# Patient Record
Sex: MALE | Race: WHITE | NOT HISPANIC OR LATINO
[De-identification: names, ages, dates, MRNs, and addresses within clinical notes are randomized per-mention and may not be internally consistent; named-entity substitution may affect disease eponyms.]

---

## 2018-06-06 ENCOUNTER — APPOINTMENT (OUTPATIENT)
Dept: ORTHOPEDIC SURGERY | Facility: CLINIC | Age: 73
End: 2018-06-06
Payer: MEDICARE

## 2018-06-06 VITALS — RESPIRATION RATE: 16 BRPM | HEIGHT: 67 IN | WEIGHT: 145 LBS | BODY MASS INDEX: 22.76 KG/M2

## 2018-06-06 DIAGNOSIS — Z87.438 PERSONAL HISTORY OF OTHER DISEASES OF MALE GENITAL ORGANS: ICD-10-CM

## 2018-06-06 PROCEDURE — 99203 OFFICE O/P NEW LOW 30 MIN: CPT | Mod: 25

## 2018-12-19 ENCOUNTER — APPOINTMENT (OUTPATIENT)
Dept: ORTHOPEDIC SURGERY | Facility: CLINIC | Age: 73
End: 2018-12-19
Payer: MEDICARE

## 2018-12-19 VITALS — WEIGHT: 145 LBS | RESPIRATION RATE: 16 BRPM | HEIGHT: 67 IN | BODY MASS INDEX: 22.76 KG/M2

## 2018-12-19 DIAGNOSIS — M65.331 TRIGGER FINGER, RIGHT MIDDLE FINGER: ICD-10-CM

## 2018-12-19 DIAGNOSIS — M18.0 BILATERAL PRIMARY OSTEOARTHRITIS OF FIRST CARPOMETACARPAL JOINTS: ICD-10-CM

## 2018-12-19 PROCEDURE — 20550 NJX 1 TENDON SHEATH/LIGAMENT: CPT | Mod: F6

## 2018-12-19 PROCEDURE — 99214 OFFICE O/P EST MOD 30 MIN: CPT | Mod: 25

## 2019-10-21 ENCOUNTER — APPOINTMENT (OUTPATIENT)
Dept: GASTROENTEROLOGY | Facility: CLINIC | Age: 74
End: 2019-10-21

## 2020-11-20 ENCOUNTER — APPOINTMENT (OUTPATIENT)
Dept: ORTHOPEDIC SURGERY | Facility: CLINIC | Age: 75
End: 2020-11-20
Payer: MEDICARE

## 2020-11-20 VITALS — BODY MASS INDEX: 22.76 KG/M2 | RESPIRATION RATE: 16 BRPM | HEIGHT: 67 IN | WEIGHT: 145 LBS

## 2020-11-20 DIAGNOSIS — M65.321 TRIGGER FINGER, RIGHT INDEX FINGER: ICD-10-CM

## 2020-11-20 DIAGNOSIS — M65.351 TRIGGER FINGER, RIGHT LITTLE FINGER: ICD-10-CM

## 2020-11-20 PROCEDURE — 20550 NJX 1 TENDON SHEATH/LIGAMENT: CPT | Mod: F9

## 2020-11-20 PROCEDURE — 99214 OFFICE O/P EST MOD 30 MIN: CPT | Mod: 25

## 2022-04-11 ENCOUNTER — APPOINTMENT (OUTPATIENT)
Dept: OTOLARYNGOLOGY | Facility: CLINIC | Age: 77
End: 2022-04-11

## 2023-07-22 ENCOUNTER — NON-APPOINTMENT (OUTPATIENT)
Age: 78
End: 2023-07-22

## 2023-07-26 ENCOUNTER — NON-APPOINTMENT (OUTPATIENT)
Age: 78
End: 2023-07-26

## 2023-07-27 ENCOUNTER — APPOINTMENT (OUTPATIENT)
Dept: ORTHOPEDIC SURGERY | Facility: CLINIC | Age: 78
End: 2023-07-27
Payer: MEDICARE

## 2023-07-27 ENCOUNTER — TRANSCRIPTION ENCOUNTER (OUTPATIENT)
Age: 78
End: 2023-07-27

## 2023-07-27 VITALS
HEART RATE: 72 BPM | BODY MASS INDEX: 22.76 KG/M2 | DIASTOLIC BLOOD PRESSURE: 84 MMHG | SYSTOLIC BLOOD PRESSURE: 159 MMHG | OXYGEN SATURATION: 96 % | HEIGHT: 67 IN | WEIGHT: 145 LBS

## 2023-07-27 DIAGNOSIS — M43.16 SPONDYLOLISTHESIS, LUMBAR REGION: ICD-10-CM

## 2023-07-27 PROCEDURE — 99204 OFFICE O/P NEW MOD 45 MIN: CPT

## 2023-07-27 RX ORDER — OXYCODONE 10 MG/1
10 TABLET ORAL
Refills: 0 | Status: ACTIVE | COMMUNITY

## 2023-07-27 RX ORDER — OLMESARTAN MEDOXOMIL 20 MG/1
20 TABLET, FILM COATED ORAL
Refills: 0 | Status: ACTIVE | COMMUNITY

## 2023-07-27 RX ORDER — BUPRENORPHINE HYDROCHLORIDE 75 UG/1
75 FILM, SOLUBLE BUCCAL
Refills: 0 | Status: ACTIVE | COMMUNITY

## 2023-07-27 RX ORDER — VENLAFAXINE HYDROCHLORIDE 37.5 MG/1
37.5 CAPSULE, EXTENDED RELEASE ORAL
Refills: 0 | Status: ACTIVE | COMMUNITY

## 2023-07-27 RX ORDER — SILODOSIN 8 MG/1
8 CAPSULE ORAL
Refills: 0 | Status: ACTIVE | COMMUNITY

## 2023-07-27 RX ORDER — SELEGILINE HYDROCHLORIDE 5 MG/1
5 CAPSULE ORAL
Refills: 0 | Status: ACTIVE | COMMUNITY

## 2023-08-02 ENCOUNTER — APPOINTMENT (OUTPATIENT)
Dept: ORTHOPEDIC SURGERY | Facility: CLINIC | Age: 78
End: 2023-08-02
Payer: MEDICARE

## 2023-08-02 VITALS
DIASTOLIC BLOOD PRESSURE: 82 MMHG | HEART RATE: 73 BPM | BODY MASS INDEX: 21.69 KG/M2 | HEIGHT: 66 IN | OXYGEN SATURATION: 96 % | WEIGHT: 135 LBS | SYSTOLIC BLOOD PRESSURE: 138 MMHG | TEMPERATURE: 98 F

## 2023-08-02 VITALS — HEIGHT: 66 IN | WEIGHT: 135 LBS | BODY MASS INDEX: 21.69 KG/M2

## 2023-08-02 PROCEDURE — 99214 OFFICE O/P EST MOD 30 MIN: CPT

## 2023-08-02 RX ORDER — VENLAFAXINE HYDROCHLORIDE 150 MG/1
150 CAPSULE, EXTENDED RELEASE ORAL
Refills: 0 | Status: COMPLETED | COMMUNITY
End: 2023-08-02

## 2023-08-09 NOTE — PHYSICAL EXAM
[de-identified] : NVI, except bilateral EHL/tib ant 4/5.   Lumbar discomfrt with extension.  + bilateral SLR.   [de-identified] : MRI L spine 5/16/23:  L4-5 spondylolisthesis, severe central spinal stenosis\par \par No xray

## 2023-08-09 NOTE — DISCUSSION/SUMMARY
[Surgical risks reviewed] : Surgical risks reviewed [de-identified] : A lengthy discussion was held with the patient regarding his condition.  We discussed nonoperative treatment strategies including physical therapy, pharmacologic management and steroid injections.  We discussed surgical options (PSF L3-5) and the attendant benefits, alternatives, and risks, including but not limited to infection, bleeding, persistent pain, persistent neurologic deficit, neurologic injury, adjacent segment degeneration, and the need for future surgery.  The patient's questions were sought and answered satisfactorily.. He will proceed with medical clearance.   I, Letitia Weems NP am acting as a scribe for Dr. Frank Schwab.

## 2023-08-09 NOTE — HISTORY OF PRESENT ILLNESS
[de-identified] : Pt presents with h/o low back pain.  He was dx'd with spondy 35 years ago.  In February 2023, developed pain in his buttocks, posterior thighs, to the knees, not below the knees.  He currently has no lbp, only radicular symptoms.   He has felt better in the past few weeks, can walk about 20 blocks.  Has been in PT.  Had 3 ESIs- first 2 lasted about a month, 3rd still keeps him "mobile".

## 2023-08-09 NOTE — DISCUSSION/SUMMARY
[de-identified] : A lengthy discussion was had with the patient regarding his condition.  Rx CT lumbar spine - r/o L4 lysis?.   Discussed possible L4-5 tlif pending imaging.  Can review via telehealth or via phone. \par The patient's questions were sought and answered satisfactorily.\par \par \par Letitia VILA, NP am acting as a scribe for Dr. Frank Schwab.\par \par

## 2023-08-09 NOTE — PHYSICAL EXAM
[de-identified] : NVI, except bilateral EHL/tib ant 4/5. Lumbar discomfrt with extension. + bilateral SLR. [de-identified] :  MRI L spine 5/16/23: L4-5 spondylolisthesis, severe central spinal stenosis  No xray.  CT L spine 7/28/2023:  L4-5 spondylolisthesis L3-4 advanced degeneration with cystic changes.

## 2023-08-09 NOTE — HISTORY OF PRESENT ILLNESS
[de-identified] : Pt presents in f/u to review CT L spine ([facility]).  No significant changes in symptoms.  Has lower back and posterior thigh pain with walking.  He needs to hold onto kitchen counter when preparing dinner due to low back pain.   7/27/2023: Pt presents with h/o low back pain. He was dx'd with spondy 35 years ago. In February 2023, developed pain in his buttocks, posterior thighs, to the knees, not below the knees. He currently has no lbp, only radicular symptoms. He has felt better in the past few weeks, can walk about 20 blocks. Has been in PT. Had 3 ESIs- first 2 lasted about a month, 3rd still keeps him "mobile".

## 2023-12-01 ENCOUNTER — INPATIENT (INPATIENT)
Facility: HOSPITAL | Age: 78
LOS: 4 days | Discharge: NURSING, REHAB OR LONG-TERM CARE | DRG: 522 | End: 2023-12-06
Attending: ORTHOPAEDIC SURGERY | Admitting: ORTHOPAEDIC SURGERY
Payer: MEDICARE

## 2023-12-01 ENCOUNTER — TRANSCRIPTION ENCOUNTER (OUTPATIENT)
Age: 78
End: 2023-12-01

## 2023-12-01 VITALS
OXYGEN SATURATION: 92 % | SYSTOLIC BLOOD PRESSURE: 171 MMHG | TEMPERATURE: 99 F | RESPIRATION RATE: 16 BRPM | HEART RATE: 80 BPM | DIASTOLIC BLOOD PRESSURE: 82 MMHG

## 2023-12-01 DIAGNOSIS — F13.20 SEDATIVE, HYPNOTIC OR ANXIOLYTIC DEPENDENCE, UNCOMPLICATED: ICD-10-CM

## 2023-12-01 DIAGNOSIS — Z01.818 ENCOUNTER FOR OTHER PREPROCEDURAL EXAMINATION: ICD-10-CM

## 2023-12-01 DIAGNOSIS — Z98.890 OTHER SPECIFIED POSTPROCEDURAL STATES: Chronic | ICD-10-CM

## 2023-12-01 LAB
ALBUMIN SERPL ELPH-MCNC: 3.9 G/DL — SIGNIFICANT CHANGE UP (ref 3.3–5)
ALBUMIN SERPL ELPH-MCNC: 3.9 G/DL — SIGNIFICANT CHANGE UP (ref 3.3–5)
ALP SERPL-CCNC: 105 U/L — SIGNIFICANT CHANGE UP (ref 40–120)
ALP SERPL-CCNC: 105 U/L — SIGNIFICANT CHANGE UP (ref 40–120)
ALT FLD-CCNC: 31 U/L — SIGNIFICANT CHANGE UP (ref 10–45)
ALT FLD-CCNC: 31 U/L — SIGNIFICANT CHANGE UP (ref 10–45)
AMPHET UR-MCNC: NEGATIVE — SIGNIFICANT CHANGE UP
AMPHET UR-MCNC: NEGATIVE — SIGNIFICANT CHANGE UP
ANION GAP SERPL CALC-SCNC: 12 MMOL/L — SIGNIFICANT CHANGE UP (ref 5–17)
ANION GAP SERPL CALC-SCNC: 12 MMOL/L — SIGNIFICANT CHANGE UP (ref 5–17)
ANISOCYTOSIS BLD QL: SLIGHT — SIGNIFICANT CHANGE UP
ANISOCYTOSIS BLD QL: SLIGHT — SIGNIFICANT CHANGE UP
APAP SERPL-MCNC: <5 UG/ML — LOW (ref 10–30)
APAP SERPL-MCNC: <5 UG/ML — LOW (ref 10–30)
APPEARANCE UR: CLEAR — SIGNIFICANT CHANGE UP
APPEARANCE UR: CLEAR — SIGNIFICANT CHANGE UP
APTT BLD: 27.9 SEC — SIGNIFICANT CHANGE UP (ref 24.5–35.6)
APTT BLD: 27.9 SEC — SIGNIFICANT CHANGE UP (ref 24.5–35.6)
AST SERPL-CCNC: 28 U/L — SIGNIFICANT CHANGE UP (ref 10–40)
AST SERPL-CCNC: 28 U/L — SIGNIFICANT CHANGE UP (ref 10–40)
BACTERIA # UR AUTO: NEGATIVE /HPF — SIGNIFICANT CHANGE UP
BACTERIA # UR AUTO: NEGATIVE /HPF — SIGNIFICANT CHANGE UP
BARBITURATES UR SCN-MCNC: NEGATIVE — SIGNIFICANT CHANGE UP
BARBITURATES UR SCN-MCNC: NEGATIVE — SIGNIFICANT CHANGE UP
BASOPHILS # BLD AUTO: 0.09 K/UL — SIGNIFICANT CHANGE UP (ref 0–0.2)
BASOPHILS # BLD AUTO: 0.09 K/UL — SIGNIFICANT CHANGE UP (ref 0–0.2)
BASOPHILS NFR BLD AUTO: 0.9 % — SIGNIFICANT CHANGE UP (ref 0–2)
BASOPHILS NFR BLD AUTO: 0.9 % — SIGNIFICANT CHANGE UP (ref 0–2)
BENZODIAZ UR-MCNC: POSITIVE
BENZODIAZ UR-MCNC: POSITIVE
BILIRUB SERPL-MCNC: 0.4 MG/DL — SIGNIFICANT CHANGE UP (ref 0.2–1.2)
BILIRUB SERPL-MCNC: 0.4 MG/DL — SIGNIFICANT CHANGE UP (ref 0.2–1.2)
BILIRUB UR-MCNC: NEGATIVE — SIGNIFICANT CHANGE UP
BILIRUB UR-MCNC: NEGATIVE — SIGNIFICANT CHANGE UP
BLD GP AB SCN SERPL QL: NEGATIVE — SIGNIFICANT CHANGE UP
BLD GP AB SCN SERPL QL: NEGATIVE — SIGNIFICANT CHANGE UP
BUN SERPL-MCNC: 32 MG/DL — HIGH (ref 7–23)
BUN SERPL-MCNC: 32 MG/DL — HIGH (ref 7–23)
CALCIUM SERPL-MCNC: 9.2 MG/DL — SIGNIFICANT CHANGE UP (ref 8.4–10.5)
CALCIUM SERPL-MCNC: 9.2 MG/DL — SIGNIFICANT CHANGE UP (ref 8.4–10.5)
CHLORIDE SERPL-SCNC: 101 MMOL/L — SIGNIFICANT CHANGE UP (ref 96–108)
CHLORIDE SERPL-SCNC: 101 MMOL/L — SIGNIFICANT CHANGE UP (ref 96–108)
CK MB CFR SERPL CALC: 6.7 NG/ML — SIGNIFICANT CHANGE UP (ref 0–6.7)
CK MB CFR SERPL CALC: 6.7 NG/ML — SIGNIFICANT CHANGE UP (ref 0–6.7)
CK SERPL-CCNC: 163 U/L — SIGNIFICANT CHANGE UP (ref 30–200)
CK SERPL-CCNC: 163 U/L — SIGNIFICANT CHANGE UP (ref 30–200)
CO2 SERPL-SCNC: 25 MMOL/L — SIGNIFICANT CHANGE UP (ref 22–31)
CO2 SERPL-SCNC: 25 MMOL/L — SIGNIFICANT CHANGE UP (ref 22–31)
COCAINE METAB.OTHER UR-MCNC: NEGATIVE — SIGNIFICANT CHANGE UP
COCAINE METAB.OTHER UR-MCNC: NEGATIVE — SIGNIFICANT CHANGE UP
COLOR SPEC: SIGNIFICANT CHANGE UP
COLOR SPEC: SIGNIFICANT CHANGE UP
CREAT SERPL-MCNC: 0.69 MG/DL — SIGNIFICANT CHANGE UP (ref 0.5–1.3)
CREAT SERPL-MCNC: 0.69 MG/DL — SIGNIFICANT CHANGE UP (ref 0.5–1.3)
DIFF PNL FLD: NEGATIVE — SIGNIFICANT CHANGE UP
DIFF PNL FLD: NEGATIVE — SIGNIFICANT CHANGE UP
EGFR: 95 ML/MIN/1.73M2 — SIGNIFICANT CHANGE UP
EGFR: 95 ML/MIN/1.73M2 — SIGNIFICANT CHANGE UP
EOSINOPHIL # BLD AUTO: 0 K/UL — SIGNIFICANT CHANGE UP (ref 0–0.5)
EOSINOPHIL # BLD AUTO: 0 K/UL — SIGNIFICANT CHANGE UP (ref 0–0.5)
EOSINOPHIL NFR BLD AUTO: 0 % — SIGNIFICANT CHANGE UP (ref 0–6)
EOSINOPHIL NFR BLD AUTO: 0 % — SIGNIFICANT CHANGE UP (ref 0–6)
ETHANOL SERPL-MCNC: <10 MG/DL — SIGNIFICANT CHANGE UP (ref 0–10)
ETHANOL SERPL-MCNC: <10 MG/DL — SIGNIFICANT CHANGE UP (ref 0–10)
GLUCOSE SERPL-MCNC: 134 MG/DL — HIGH (ref 70–99)
GLUCOSE SERPL-MCNC: 134 MG/DL — HIGH (ref 70–99)
GLUCOSE UR QL: NEGATIVE MG/DL — SIGNIFICANT CHANGE UP
GLUCOSE UR QL: NEGATIVE MG/DL — SIGNIFICANT CHANGE UP
HCT VFR BLD CALC: 34.7 % — LOW (ref 39–50)
HCT VFR BLD CALC: 34.7 % — LOW (ref 39–50)
HGB BLD-MCNC: 11.5 G/DL — LOW (ref 13–17)
HGB BLD-MCNC: 11.5 G/DL — LOW (ref 13–17)
HYPOCHROMIA BLD QL: SLIGHT — SIGNIFICANT CHANGE UP
HYPOCHROMIA BLD QL: SLIGHT — SIGNIFICANT CHANGE UP
INR BLD: 0.92 — SIGNIFICANT CHANGE UP (ref 0.85–1.18)
INR BLD: 0.92 — SIGNIFICANT CHANGE UP (ref 0.85–1.18)
KETONES UR-MCNC: 15 MG/DL
KETONES UR-MCNC: 15 MG/DL
LEUKOCYTE ESTERASE UR-ACNC: NEGATIVE — SIGNIFICANT CHANGE UP
LEUKOCYTE ESTERASE UR-ACNC: NEGATIVE — SIGNIFICANT CHANGE UP
LYMPHOCYTES # BLD AUTO: 0.16 K/UL — LOW (ref 1–3.3)
LYMPHOCYTES # BLD AUTO: 0.16 K/UL — LOW (ref 1–3.3)
LYMPHOCYTES # BLD AUTO: 1.7 % — LOW (ref 13–44)
LYMPHOCYTES # BLD AUTO: 1.7 % — LOW (ref 13–44)
MACROCYTES BLD QL: SLIGHT — SIGNIFICANT CHANGE UP
MACROCYTES BLD QL: SLIGHT — SIGNIFICANT CHANGE UP
MAGNESIUM SERPL-MCNC: 2 MG/DL — SIGNIFICANT CHANGE UP (ref 1.6–2.6)
MAGNESIUM SERPL-MCNC: 2 MG/DL — SIGNIFICANT CHANGE UP (ref 1.6–2.6)
MANUAL SMEAR VERIFICATION: SIGNIFICANT CHANGE UP
MANUAL SMEAR VERIFICATION: SIGNIFICANT CHANGE UP
MCHC RBC-ENTMCNC: 28.7 PG — SIGNIFICANT CHANGE UP (ref 27–34)
MCHC RBC-ENTMCNC: 28.7 PG — SIGNIFICANT CHANGE UP (ref 27–34)
MCHC RBC-ENTMCNC: 33.1 GM/DL — SIGNIFICANT CHANGE UP (ref 32–36)
MCHC RBC-ENTMCNC: 33.1 GM/DL — SIGNIFICANT CHANGE UP (ref 32–36)
MCV RBC AUTO: 86.5 FL — SIGNIFICANT CHANGE UP (ref 80–100)
MCV RBC AUTO: 86.5 FL — SIGNIFICANT CHANGE UP (ref 80–100)
METHADONE UR-MCNC: NEGATIVE — SIGNIFICANT CHANGE UP
METHADONE UR-MCNC: NEGATIVE — SIGNIFICANT CHANGE UP
MICROCYTES BLD QL: SLIGHT — SIGNIFICANT CHANGE UP
MICROCYTES BLD QL: SLIGHT — SIGNIFICANT CHANGE UP
MONOCYTES # BLD AUTO: 0.42 K/UL — SIGNIFICANT CHANGE UP (ref 0–0.9)
MONOCYTES # BLD AUTO: 0.42 K/UL — SIGNIFICANT CHANGE UP (ref 0–0.9)
MONOCYTES NFR BLD AUTO: 4.4 % — SIGNIFICANT CHANGE UP (ref 2–14)
MONOCYTES NFR BLD AUTO: 4.4 % — SIGNIFICANT CHANGE UP (ref 2–14)
NEUTROPHILS # BLD AUTO: 8.95 K/UL — HIGH (ref 1.8–7.4)
NEUTROPHILS # BLD AUTO: 8.95 K/UL — HIGH (ref 1.8–7.4)
NEUTROPHILS NFR BLD AUTO: 93 % — HIGH (ref 43–77)
NEUTROPHILS NFR BLD AUTO: 93 % — HIGH (ref 43–77)
NITRITE UR-MCNC: NEGATIVE — SIGNIFICANT CHANGE UP
NITRITE UR-MCNC: NEGATIVE — SIGNIFICANT CHANGE UP
OPIATES UR-MCNC: POSITIVE
OPIATES UR-MCNC: POSITIVE
OVALOCYTES BLD QL SMEAR: SLIGHT — SIGNIFICANT CHANGE UP
OVALOCYTES BLD QL SMEAR: SLIGHT — SIGNIFICANT CHANGE UP
PCP SPEC-MCNC: SIGNIFICANT CHANGE UP
PCP SPEC-MCNC: SIGNIFICANT CHANGE UP
PCP UR-MCNC: NEGATIVE — SIGNIFICANT CHANGE UP
PCP UR-MCNC: NEGATIVE — SIGNIFICANT CHANGE UP
PH UR: 5.5 — SIGNIFICANT CHANGE UP (ref 5–8)
PH UR: 5.5 — SIGNIFICANT CHANGE UP (ref 5–8)
PHOSPHATE SERPL-MCNC: 3.2 MG/DL — SIGNIFICANT CHANGE UP (ref 2.5–4.5)
PHOSPHATE SERPL-MCNC: 3.2 MG/DL — SIGNIFICANT CHANGE UP (ref 2.5–4.5)
PLAT MORPH BLD: ABNORMAL
PLAT MORPH BLD: ABNORMAL
PLATELET # BLD AUTO: 279 K/UL — SIGNIFICANT CHANGE UP (ref 150–400)
PLATELET # BLD AUTO: 279 K/UL — SIGNIFICANT CHANGE UP (ref 150–400)
POIKILOCYTOSIS BLD QL AUTO: SLIGHT — SIGNIFICANT CHANGE UP
POIKILOCYTOSIS BLD QL AUTO: SLIGHT — SIGNIFICANT CHANGE UP
POLYCHROMASIA BLD QL SMEAR: SLIGHT — SIGNIFICANT CHANGE UP
POLYCHROMASIA BLD QL SMEAR: SLIGHT — SIGNIFICANT CHANGE UP
POTASSIUM SERPL-MCNC: 4 MMOL/L — SIGNIFICANT CHANGE UP (ref 3.5–5.3)
POTASSIUM SERPL-MCNC: 4 MMOL/L — SIGNIFICANT CHANGE UP (ref 3.5–5.3)
POTASSIUM SERPL-SCNC: 4 MMOL/L — SIGNIFICANT CHANGE UP (ref 3.5–5.3)
POTASSIUM SERPL-SCNC: 4 MMOL/L — SIGNIFICANT CHANGE UP (ref 3.5–5.3)
PROT SERPL-MCNC: 6.3 G/DL — SIGNIFICANT CHANGE UP (ref 6–8.3)
PROT SERPL-MCNC: 6.3 G/DL — SIGNIFICANT CHANGE UP (ref 6–8.3)
PROT UR-MCNC: 30 MG/DL
PROT UR-MCNC: 30 MG/DL
PROTHROM AB SERPL-ACNC: 10.5 SEC — SIGNIFICANT CHANGE UP (ref 9.5–13)
PROTHROM AB SERPL-ACNC: 10.5 SEC — SIGNIFICANT CHANGE UP (ref 9.5–13)
RBC # BLD: 4.01 M/UL — LOW (ref 4.2–5.8)
RBC # BLD: 4.01 M/UL — LOW (ref 4.2–5.8)
RBC # FLD: 13 % — SIGNIFICANT CHANGE UP (ref 10.3–14.5)
RBC # FLD: 13 % — SIGNIFICANT CHANGE UP (ref 10.3–14.5)
RBC BLD AUTO: ABNORMAL
RBC BLD AUTO: ABNORMAL
RBC CASTS # UR COMP ASSIST: 3 /HPF — SIGNIFICANT CHANGE UP (ref 0–4)
RBC CASTS # UR COMP ASSIST: 3 /HPF — SIGNIFICANT CHANGE UP (ref 0–4)
RH IG SCN BLD-IMP: POSITIVE — SIGNIFICANT CHANGE UP
RH IG SCN BLD-IMP: POSITIVE — SIGNIFICANT CHANGE UP
SALICYLATES SERPL-MCNC: <0.3 MG/DL — LOW (ref 2.8–20)
SALICYLATES SERPL-MCNC: <0.3 MG/DL — LOW (ref 2.8–20)
SODIUM SERPL-SCNC: 138 MMOL/L — SIGNIFICANT CHANGE UP (ref 135–145)
SODIUM SERPL-SCNC: 138 MMOL/L — SIGNIFICANT CHANGE UP (ref 135–145)
SP GR SPEC: >1.03 — HIGH (ref 1–1.03)
SP GR SPEC: >1.03 — HIGH (ref 1–1.03)
SQUAMOUS # UR AUTO: 0 /HPF — SIGNIFICANT CHANGE UP (ref 0–5)
SQUAMOUS # UR AUTO: 0 /HPF — SIGNIFICANT CHANGE UP (ref 0–5)
THC UR QL: NEGATIVE — SIGNIFICANT CHANGE UP
THC UR QL: NEGATIVE — SIGNIFICANT CHANGE UP
TROPONIN T, HIGH SENSITIVITY RESULT: 18 NG/L — SIGNIFICANT CHANGE UP (ref 0–51)
TROPONIN T, HIGH SENSITIVITY RESULT: 18 NG/L — SIGNIFICANT CHANGE UP (ref 0–51)
TROPONIN T, HIGH SENSITIVITY RESULT: 20 NG/L — SIGNIFICANT CHANGE UP (ref 0–51)
TROPONIN T, HIGH SENSITIVITY RESULT: 20 NG/L — SIGNIFICANT CHANGE UP (ref 0–51)
TSH SERPL-MCNC: 4.1 UIU/ML — SIGNIFICANT CHANGE UP (ref 0.27–4.2)
TSH SERPL-MCNC: 4.1 UIU/ML — SIGNIFICANT CHANGE UP (ref 0.27–4.2)
UROBILINOGEN FLD QL: 1 MG/DL — SIGNIFICANT CHANGE UP (ref 0.2–1)
UROBILINOGEN FLD QL: 1 MG/DL — SIGNIFICANT CHANGE UP (ref 0.2–1)
WBC # BLD: 9.62 K/UL — SIGNIFICANT CHANGE UP (ref 3.8–10.5)
WBC # BLD: 9.62 K/UL — SIGNIFICANT CHANGE UP (ref 3.8–10.5)
WBC # FLD AUTO: 9.62 K/UL — SIGNIFICANT CHANGE UP (ref 3.8–10.5)
WBC # FLD AUTO: 9.62 K/UL — SIGNIFICANT CHANGE UP (ref 3.8–10.5)
WBC UR QL: 1 /HPF — SIGNIFICANT CHANGE UP (ref 0–5)
WBC UR QL: 1 /HPF — SIGNIFICANT CHANGE UP (ref 0–5)

## 2023-12-01 PROCEDURE — 73610 X-RAY EXAM OF ANKLE: CPT | Mod: 26,LT

## 2023-12-01 PROCEDURE — 72192 CT PELVIS W/O DYE: CPT | Mod: 26,MA

## 2023-12-01 PROCEDURE — 70450 CT HEAD/BRAIN W/O DYE: CPT | Mod: 26,MA

## 2023-12-01 PROCEDURE — 93010 ELECTROCARDIOGRAM REPORT: CPT

## 2023-12-01 PROCEDURE — 99285 EMERGENCY DEPT VISIT HI MDM: CPT

## 2023-12-01 PROCEDURE — 72125 CT NECK SPINE W/O DYE: CPT | Mod: 26,MA

## 2023-12-01 PROCEDURE — 73502 X-RAY EXAM HIP UNI 2-3 VIEWS: CPT | Mod: 26,LT

## 2023-12-01 PROCEDURE — 99223 1ST HOSP IP/OBS HIGH 75: CPT

## 2023-12-01 PROCEDURE — 71045 X-RAY EXAM CHEST 1 VIEW: CPT | Mod: 26

## 2023-12-01 RX ORDER — HYDROMORPHONE HYDROCHLORIDE 2 MG/ML
1 INJECTION INTRAMUSCULAR; INTRAVENOUS; SUBCUTANEOUS ONCE
Refills: 0 | Status: DISCONTINUED | OUTPATIENT
Start: 2023-12-01 | End: 2023-12-01

## 2023-12-01 RX ORDER — SODIUM CHLORIDE 9 MG/ML
1000 INJECTION, SOLUTION INTRAVENOUS
Refills: 0 | Status: DISCONTINUED | OUTPATIENT
Start: 2023-12-01 | End: 2023-12-02

## 2023-12-01 RX ORDER — FAMOTIDINE 10 MG/ML
20 INJECTION INTRAVENOUS EVERY 12 HOURS
Refills: 0 | Status: DISCONTINUED | OUTPATIENT
Start: 2023-12-01 | End: 2023-12-02

## 2023-12-01 RX ORDER — ALPRAZOLAM 0.25 MG
0.5 TABLET ORAL DAILY
Refills: 0 | Status: DISCONTINUED | OUTPATIENT
Start: 2023-12-01 | End: 2023-12-06

## 2023-12-01 RX ORDER — OXYCODONE HYDROCHLORIDE 5 MG/1
5 TABLET ORAL EVERY 4 HOURS
Refills: 0 | Status: DISCONTINUED | OUTPATIENT
Start: 2023-12-01 | End: 2023-12-02

## 2023-12-01 RX ORDER — OXYCODONE HYDROCHLORIDE 5 MG/1
10 TABLET ORAL EVERY 4 HOURS
Refills: 0 | Status: DISCONTINUED | OUTPATIENT
Start: 2023-12-01 | End: 2023-12-02

## 2023-12-01 RX ORDER — LANOLIN ALCOHOL/MO/W.PET/CERES
5 CREAM (GRAM) TOPICAL AT BEDTIME
Refills: 0 | Status: DISCONTINUED | OUTPATIENT
Start: 2023-12-01 | End: 2023-12-06

## 2023-12-01 RX ORDER — ACETAMINOPHEN 500 MG
1000 TABLET ORAL ONCE
Refills: 0 | Status: COMPLETED | OUTPATIENT
Start: 2023-12-01 | End: 2023-12-01

## 2023-12-01 RX ORDER — HYDROMORPHONE HYDROCHLORIDE 2 MG/ML
0.5 INJECTION INTRAMUSCULAR; INTRAVENOUS; SUBCUTANEOUS EVERY 4 HOURS
Refills: 0 | Status: DISCONTINUED | OUTPATIENT
Start: 2023-12-01 | End: 2023-12-02

## 2023-12-01 RX ADMIN — HYDROMORPHONE HYDROCHLORIDE 0.5 MILLIGRAM(S): 2 INJECTION INTRAMUSCULAR; INTRAVENOUS; SUBCUTANEOUS at 23:36

## 2023-12-01 RX ADMIN — HYDROMORPHONE HYDROCHLORIDE 1 MILLIGRAM(S): 2 INJECTION INTRAMUSCULAR; INTRAVENOUS; SUBCUTANEOUS at 15:23

## 2023-12-01 RX ADMIN — Medication 400 MILLIGRAM(S): at 17:16

## 2023-12-01 RX ADMIN — HYDROMORPHONE HYDROCHLORIDE 1 MILLIGRAM(S): 2 INJECTION INTRAMUSCULAR; INTRAVENOUS; SUBCUTANEOUS at 19:30

## 2023-12-01 RX ADMIN — Medication 1000 MILLIGRAM(S): at 19:30

## 2023-12-01 RX ADMIN — HYDROMORPHONE HYDROCHLORIDE 0.5 MILLIGRAM(S): 2 INJECTION INTRAMUSCULAR; INTRAVENOUS; SUBCUTANEOUS at 22:36

## 2023-12-01 RX ADMIN — OXYCODONE HYDROCHLORIDE 10 MILLIGRAM(S): 5 TABLET ORAL at 21:05

## 2023-12-01 RX ADMIN — OXYCODONE HYDROCHLORIDE 10 MILLIGRAM(S): 5 TABLET ORAL at 22:05

## 2023-12-01 RX ADMIN — HYDROMORPHONE HYDROCHLORIDE 1 MILLIGRAM(S): 2 INJECTION INTRAMUSCULAR; INTRAVENOUS; SUBCUTANEOUS at 17:18

## 2023-12-01 NOTE — ED BEHAVIORAL HEALTH ASSESSMENT NOTE - PAST PSYCHOTROPIC MEDICATION
patient endorsed trials of zoloft and wellbutrin but had too much word finding difficulty to list others

## 2023-12-01 NOTE — ED BEHAVIORAL HEALTH ASSESSMENT NOTE - SUMMARY
Mr. Tijerina is a 78 year old man with lifelong history of depression. Psychiatry consulted as patient was expressing suicidal ideation. States that during his PHQ screening he expressed his lifelong history of depression and says that he has chronic suicidal thoughts that "come and go." He is actively in treatment with a Dr. Jorge Teixeira [phone], and has been for decades. Currently taking Effexor  mg daily and alprazolam 1 mg nightly for sleep (recently escalated from 0.5 mg).     At this time denies active suicidal ideation, intent, or plan. Would likely benefit from some escalation of care in the outpatient setting, as he has an elevated chronic suicide risk compared to general population. However currently he presents a low acute risk to his safety or the safety of others    -No psychiatric indication for 1:1  -No psychiatric contraindication to discharge  -Psychiatry CL team is available for re-consultation should primary team have concerns or questions  -Patient takes benzodiazepine for sleep. See HPI for more details. PLEASE BE AWARE that acute benzo withdrawal can be trigger for delirium, especially in older post-op  patients.   -Strongly recommend CIWA protocol to prevent acute benzodiazepine withdrawal for patient.

## 2023-12-01 NOTE — CONSULT NOTE ADULT - SUBJECTIVE AND OBJECTIVE BOX
*** MEDICINE INITIAL CONSULT NOTE ***    HPI (per initial H&P):   78y M pmhx depression, Spine surgery with Dr. Schwab, and ankle fracture 6 weeks ago treated nonoperatively presents with left hip pain. Patient today had a mechanical fall today Patient denies headstrike, or LOC. Patient denies any numbness/tingling in the affected extremity. The patient normally ambulates without assistance at baseline. They deny any other orthopedic injuries at this time. Denies taking any blood thinners.   (01 Dec 2023 20:30)      FAMILY HISTORY:    PAST MEDICAL & SURGICAL HISTORY:  Major depressive disorder      Status post lumbar spine operation          SOCIAL HISTORY:  Tobacco use:  EtOH use:  Illicit drug use:    REVIEW OF SYSTEMS: see HPI    MEDICATIONS:  MEDICATIONS  (STANDING):  famotidine    Tablet 20 milliGRAM(s) Oral every 12 hours  lactated ringers. 1000 milliLiter(s) (100 mL/Hr) IV Continuous <Continuous>    MEDICATIONS  (PRN):  HYDROmorphone  Injectable 0.5 milliGRAM(s) IV Push every 4 hours PRN breakthrough  melatonin 5 milliGRAM(s) Oral at bedtime PRN Insomnia  oxyCODONE    IR 10 milliGRAM(s) Oral every 4 hours PRN Moderate Pain (4 - 6)  oxyCODONE    IR 5 milliGRAM(s) Oral every 4 hours PRN Mild Pain (1 - 3)      ALLERGIES:  Allergies    No Known Allergies    Intolerances        VITAL SIGNS:  Vital Signs Last 24 Hrs  T(C): 36.8 (01 Dec 2023 19:08), Max: 37 (01 Dec 2023 13:51)  T(F): 98.2 (01 Dec 2023 19:08), Max: 98.6 (01 Dec 2023 13:51)  HR: 97 (01 Dec 2023 19:08) (80 - 97)  BP: 177/90 (01 Dec 2023 19:08) (171/82 - 177/90)  BP(mean): --  RR: 15 (01 Dec 2023 19:08) (15 - 16)  SpO2: 96% (01 Dec 2023 19:08) (92% - 96%)    Parameters below as of 01 Dec 2023 19:08  Patient On (Oxygen Delivery Method): room air        12-01-23 @ 07:01  -  12-01-23 @ 21:37  --------------------------------------------------------  IN:  Total IN: 0 mL    OUT:    Indwelling Catheter - Urethral (mL): 600 mL  Total OUT: 600 mL    Total NET: -600 mL          PHYSICAL EXAM:  Constitutional: WDWN resting comfortably in bed; NAD  Head: NC/AT  Eyes: PERRL, EOMI, anicteric sclera  ENT: no nasal discharge; uvula midline, no oropharyngeal erythema or exudates; MMM  Neck: supple; no JVD or thyromegaly  Respiratory: CTA B/L; no W/R/R, no retractions  Cardiac: +S1/S2; RRR; no M/R/G; PMI non-displaced  Gastrointestinal: abdomen soft, NT/ND; no rebound or guarding; +BSx4  Genitourinary: normal external genitalia  Back: spine midline, no bony tenderness or step-offs; no CVAT B/L  Extremities: WWP, no clubbing or cyanosis; no peripheral edema  Musculoskeletal: NROM x4; no joint swelling, tenderness or erythema  Vascular: 2+ radial, femoral, DP/PT pulses B/L  Dermatologic: skin warm, dry and intact; no rashes, wounds, or scars  Lymphatic: no submandibular or cervical LAD  Neurologic: AAOx3; CNII-XII grossly intact; no focal deficits  Psychiatric: affect and characteristics of appearance, verbalizations, behaviors are appropriate    LABS:                        11.5   9.62  )-----------( 279      ( 01 Dec 2023 15:36 )             34.7     12-01    138  |  101  |  32<H>  ----------------------------<  134<H>  4.0   |  25  |  0.69    Ca    9.2      01 Dec 2023 15:36  Phos  3.2     12-01  Mg     2.0     12-01    TPro  6.3  /  Alb  3.9  /  TBili  0.4  /  DBili  x   /  AST  28  /  ALT  31  /  AlkPhos  105  12-01    PT/INR - ( 01 Dec 2023 15:36 )   PT: 10.5 sec;   INR: 0.92          PTT - ( 01 Dec 2023 15:36 )  PTT:27.9 sec  Urinalysis Basic - ( 01 Dec 2023 17:43 )    Color: Dark Yellow / Appearance: Clear / SG: >1.030 / pH: x  Gluc: x / Ketone: 15 mg/dL  / Bili: Negative / Urobili: 1.0 mg/dL   Blood: x / Protein: 30 mg/dL / Nitrite: Negative   Leuk Esterase: Negative / RBC: 3 /HPF / WBC 1 /HPF   Sq Epi: x / Non Sq Epi: 0 /HPF / Bacteria: Negative /HPF      CARDIAC MARKERS ( 01 Dec 2023 15:36 )  x     / x     / 163 U/L / x     / 6.7 ng/mL      CAPILLARY BLOOD GLUCOSE      RADIOLOGY & ADDITIONAL TESTS: Reviewed.   *** MEDICINE INITIAL CONSULT NOTE ***    HPI (per initial H&P):   78y M pmhx depression, Spine surgery with Dr. Schwab, and ankle fracture 6 weeks ago treated nonoperatively presents with left hip pain. Patient today had a mechanical fall today Patient denies headstrike, or LOC. Patient denies any numbness/tingling in the affected extremity. The patient normally ambulates without assistance at baseline. They deny any other orthopedic injuries at this time. Denies taking any blood thinners.   (01 Dec 2023 20:30)      FAMILY HISTORY:    PAST MEDICAL & SURGICAL HISTORY:  Major depressive disorder      Status post lumbar spine operation          SOCIAL HISTORY:  Tobacco use:  EtOH use:  Illicit drug use:    REVIEW OF SYSTEMS: see HPI    MEDICATIONS:  MEDICATIONS  (STANDING):  famotidine    Tablet 20 milliGRAM(s) Oral every 12 hours  lactated ringers. 1000 milliLiter(s) (100 mL/Hr) IV Continuous <Continuous>    MEDICATIONS  (PRN):  HYDROmorphone  Injectable 0.5 milliGRAM(s) IV Push every 4 hours PRN breakthrough  melatonin 5 milliGRAM(s) Oral at bedtime PRN Insomnia  oxyCODONE    IR 10 milliGRAM(s) Oral every 4 hours PRN Moderate Pain (4 - 6)  oxyCODONE    IR 5 milliGRAM(s) Oral every 4 hours PRN Mild Pain (1 - 3)      ALLERGIES:  Allergies    No Known Allergies    Intolerances        VITAL SIGNS:  Vital Signs Last 24 Hrs  T(C): 36.8 (01 Dec 2023 19:08), Max: 37 (01 Dec 2023 13:51)  T(F): 98.2 (01 Dec 2023 19:08), Max: 98.6 (01 Dec 2023 13:51)  HR: 97 (01 Dec 2023 19:08) (80 - 97)  BP: 177/90 (01 Dec 2023 19:08) (171/82 - 177/90)  BP(mean): --  RR: 15 (01 Dec 2023 19:08) (15 - 16)  SpO2: 96% (01 Dec 2023 19:08) (92% - 96%)    Parameters below as of 01 Dec 2023 19:08  Patient On (Oxygen Delivery Method): room air        12-01-23 @ 07:01  -  12-01-23 @ 21:37  --------------------------------------------------------  IN:  Total IN: 0 mL    OUT:    Indwelling Catheter - Urethral (mL): 600 mL  Total OUT: 600 mL    Total NET: -600 mL          PHYSICAL EXAM:  Constitutional: WDWN resting comfortably in bed; NAD  Eyes: anicteric sclera  ENT: MMM  Neck: supple;   Respiratory: CTA B/L; no W/R/R,  Cardiac: +S1/S2; RRR; no M/R/G;  Gastrointestinal: abdomen soft, NT/ND; no rebound or guarding; +BSx4  Extremities: WWP, no clubbing or cyanosis; no peripheral edema  Musculoskeletal: NROM x3 (RUE/RLE/LUE). Limited ROM of LLE 2/2 pain from known hip fracture.   Vascular: 2+ radial, femoral, DP/PT pulses B/L  Neurologic: AAOx3    LABS:                        11.5   9.62  )-----------( 279      ( 01 Dec 2023 15:36 )             34.7     12-01    138  |  101  |  32<H>  ----------------------------<  134<H>  4.0   |  25  |  0.69    Ca    9.2      01 Dec 2023 15:36  Phos  3.2     12-01  Mg     2.0     12-01    TPro  6.3  /  Alb  3.9  /  TBili  0.4  /  DBili  x   /  AST  28  /  ALT  31  /  AlkPhos  105  12-01    PT/INR - ( 01 Dec 2023 15:36 )   PT: 10.5 sec;   INR: 0.92          PTT - ( 01 Dec 2023 15:36 )  PTT:27.9 sec  Urinalysis Basic - ( 01 Dec 2023 17:43 )    Color: Dark Yellow / Appearance: Clear / SG: >1.030 / pH: x  Gluc: x / Ketone: 15 mg/dL  / Bili: Negative / Urobili: 1.0 mg/dL   Blood: x / Protein: 30 mg/dL / Nitrite: Negative   Leuk Esterase: Negative / RBC: 3 /HPF / WBC 1 /HPF   Sq Epi: x / Non Sq Epi: 0 /HPF / Bacteria: Negative /HPF      CARDIAC MARKERS ( 01 Dec 2023 15:36 )  x     / x     / 163 U/L / x     / 6.7 ng/mL      CAPILLARY BLOOD GLUCOSE      RADIOLOGY & ADDITIONAL TESTS: Reviewed.

## 2023-12-01 NOTE — H&P ADULT - ASSESSMENT
Assessment/Plan:  78y Male with L femoral neck fracture  - Admit, plan for OR for operative fixation on 12/2  - NPO at midnight, IVF while NPO  - NWB LLE , bedrest  - SCDs  - FU Preop labs  - F/u L ankle XR  - Medical comanagement appreciated, please document clearance for OR

## 2023-12-01 NOTE — ED ADULT TRIAGE NOTE - CHIEF COMPLAINT QUOTE
Left hip pain since this AM. Pt states he got out of bed and ended up on floor. Denies dizziness. Given 10mg morphine pta.

## 2023-12-01 NOTE — ED BEHAVIORAL HEALTH ASSESSMENT NOTE - HPI (INCLUDE ILLNESS QUALITY, SEVERITY, DURATION, TIMING, CONTEXT, MODIFYING FACTORS, ASSOCIATED SIGNS AND SYMPTOMS)
Mr. Tijerina is a 78 year old man with lifelong history of depression. Psychiatry consulted as patient was expressing suicidal ideation. States that during his PHQ screening he expressed his lifelong history of depression and says that he has chronic suicidal thoughts that "come and go." He is actively in treatment with a Dr. Jorge Teixeira [phone], and has been for decades. Currently taking Effexor  mg daily and alprazolam 1 mg nightly for sleep (recently escalated from 0.5 mg).     Denies any active suicidal ideation, plan, or intent. Says that he feels hopeless as he enters his old age, especially as his back and now his hip issues are needing surgery and his independence is soon to be limited. Has had two coursed of TMS which he found of limited use. 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 States he had one suicidal gesture at age 22 at which time he took several barbiturates that he was prescribed, but was not hospitalized. No suicide attempts since then. He UNAMBIGUOUSLY expresses that he does not wish to hurt himself while he is in the hospital, and that even in a broader sense he does not wish to actively hurt hisself in the community.     NOTE: Checked  registry (query ID [id number]), and patient was prescribed 270 tablets (90 day supply) alprazolam 0.5 mg by Dr. Teixeira on 9/27/2023. This amounts to 1.5 mg daily. He then filled a prescription from a Dr. Del Castillo of 30 tablets 0.25 mg on 11/28/23. Did not discuss this apparent inconsistency as  was searched after interview with patient. Will recommend [facility] protocol to prevent acute benzo withdrawal for patient.    Patient has considerable word finding difficulty during interview. he states that this is not his baseline and he attributes this to the pain medication he was prescribed while in the ED.

## 2023-12-01 NOTE — ED BEHAVIORAL HEALTH ASSESSMENT NOTE - OTHER PAST PSYCHIATRIC HISTORY (INCLUDE DETAILS REGARDING ONSET, COURSE OF ILLNESS, INPATIENT/OUTPATIENT TREATMENT)
no known history hospitalization, has had several medication trials during his outpatient treatment with Dr. Teixeira

## 2023-12-01 NOTE — ED PROVIDER NOTE - IV ALTEPLASE EXCL REL HIDDEN
show Detail Level: Detailed Depth Of Biopsy: dermis Was A Bandage Applied: Yes Size Of Lesion In Cm: 0 Biopsy Type: H and E Biopsy Method: double edge Personna blade Anesthesia Type: 1% lidocaine with epinephrine Anesthesia Volume In Cc (Will Not Render If 0): 0.5 Hemostasis: Fede's Wound Care: Petrolatum Dressing: bandage Destruction After The Procedure: No Type Of Destruction Used: Curettage Curettage Text: The wound bed was treated with curettage after the biopsy was performed. Cryotherapy Text: The wound bed was treated with cryotherapy after the biopsy was performed. Electrodesiccation Text: The wound bed was treated with electrodesiccation after the biopsy was performed. Electrodesiccation And Curettage Text: The wound bed was treated with electrodesiccation and curettage after the biopsy was performed. Silver Nitrate Text: The wound bed was treated with silver nitrate after the biopsy was performed. Lab: 6 Lab Facility: 3 Consent: Written consent was obtained and risks were reviewed including but not limited to scarring, infection, bleeding, scabbing, incomplete removal, nerve damage and allergy to anesthesia. Post-Care Instructions: I reviewed with the patient in detail post-care instructions. Patient is to keep the biopsy site dry overnight, and then apply vasoline twice daily until healed. Notification Instructions: Patient will be notified of biopsy results. However, patient instructed to call the office if not contacted within 2 weeks. Billing Type: Third-Party Bill Information: Selecting Yes will display possible errors in your note based on the variables you have selected. This validation is only offered as a suggestion for you. PLEASE NOTE THAT THE VALIDATION TEXT WILL BE REMOVED WHEN YOU FINALIZE YOUR NOTE. IF YOU WANT TO FAX A PRELIMINARY NOTE YOU WILL NEED TO TOGGLE THIS TO 'NO' IF YOU DO NOT WANT IT IN YOUR FAXED NOTE.

## 2023-12-01 NOTE — ED PROVIDER NOTE - OBJECTIVE STATEMENT
79 y/o M reports fall this morning unclear if fall was mechanical or pt syncopized. Pt describes event as feeling sleepy and woke up on ground. Pt states this occurred 4-5x this year, always while feeling sleepy. Pt never had CP, SOB, or palpitations prior to event. Pt denies head trauma, dizziness, HA, or numbness. Pt reports hip pain since fall and unable to walk.

## 2023-12-01 NOTE — ED ADULT NURSE NOTE - NSFALLHARMRISKINTERV_ED_ALL_ED
Assistance OOB with selected safe patient handling equipment if applicable/Assistance with ambulation/Communicate risk of Fall with Harm to all staff, patient, and family/Monitor gait and stability/Provide visual cue: red socks, yellow wristband, yellow gown, etc/Reinforce activity limits and safety measures with patient and family/Bed in lowest position, wheels locked, appropriate side rails in place/Call bell, personal items and telephone in reach/Instruct patient to call for assistance before getting out of bed/chair/stretcher/Non-slip footwear applied when patient is off stretcher/[place] to call system/Physically safe environment - no spills, clutter or unnecessary equipment/Purposeful Proactive Rounding/Room/bathroom lighting operational, light cord in reach

## 2023-12-01 NOTE — ED ADULT NURSE NOTE - NS ED NURSE LEVEL OF CONSCIOUSNESS AFFECT
Attempted to express unsuccessfully. Will start her on Tobradex four times a day to at least let 5 days if symptoms are still there then for 10 days. Advised to continue warm compresses. Discussed that she can hard boil eggs and put them in a clean sock or fabric in order to have a more holistic way to help bring the hordeolum down. Will continue to monitor. Calm

## 2023-12-01 NOTE — ED ADULT NURSE NOTE - HIV OFFER
Department of Anesthesiology  Postprocedure Note    Patient: Shauna Wharton  MRN: [id number]  YOB: 1951  Date of evaluation: 4/13/2022  Time:  3:22 PM     Procedure Summary     Date: 04/13/22 Room / Location: [address]    Anesthesia Start: 1449 Anesthesia Stop:     Procedure: EGD DIAGNOSTIC ONLY (N/A ) Diagnosis:       Foreign body in stomach      (foreign body in stomach)    Surgeons: Tia Goodson MD Responsible Provider: LUIZ Burton CRNA    Anesthesia Type: general ASA Status: 3          Anesthesia Type: No value filed. Win Phase I: Win Score: 8    Win Phase II:      Last vitals: Reviewed and per EMR flowsheets.        Anesthesia Post Evaluation    Patient location during evaluation: PACU  Patient participation: complete - patient participated  Level of consciousness: sleepy but conscious  Pain score: 2  Airway patency: patent  Nausea & Vomiting: no nausea and no vomiting  Complications: no  Cardiovascular status: hemodynamically stable and blood pressure returned to baseline  Respiratory status: acceptable and nasal cannula  Hydration status: stable
Opt out

## 2023-12-01 NOTE — CONSULT NOTE ADULT - ATTENDING COMMENTS
Patient is an 88 year old female, lives at home alone, with PMHx of HTN, DM, CAD(s/p stent) presented after a mechanical fall. Patient was outside her apartment and the floors had just been waxed and patient tripped and fell, falling on her side. Patient denied any chest pain, SOB, dizziness, blurry vision at time of fall. Has not had fevers, chills, cough, abdominal pain, nausea, vomiting, diarrhea. Patient has been having increasingly poor memory as of late as per patient's son and daughter.     In the ED, patient obtained Xray and CT. As per ED attending who received report from radiologist, imaging revealed intertrochanteric fracture. 77 yo M with PMHx HTN, fibromyalgia, BPH, s/p L4 spinal fusion, multiple prior surgeries (R testicle removal, appendectomy, tonsillectomy) BIBEMS for home following fall found to have L-sided femoral neck fracture. Medicine consulted on 12/1 PM for pre-operative clearance with plan for daniella-arthroplasty vs total hip arthroplasty on 12/2 with Dr. Moore.     #Pre-operative clearance - METS >4. No significant cardiac/pulmonary history apart from HTN. No adverse reactions to anesthesia in the past in self or first-degree relatives. RCRI 0 points (Class I Risk) ~ 3.9% for 30d risk of death, MI, or cardiac arrest. Moore score 0.0% for risk of MI or cardiac arrest, intraoperatively or up to 30d post-op. Low-risk for intermediate-risk procedure.     #HTN – Hold ACE/ARB nigel-operatively.     Agree with remainder of resident plan as above. Patient is an 88 year old female, lives at home alone, with PMHx of HTN, DM, CAD(s/p stent) presented after a mechanical fall. Patient was outside her apartment and the floors had just been waxed and patient tripped and fell, falling on her side. Patient denied any chest pain, SOB, dizziness, blurry vision at time of fall. Has not had fevers, chills, cough, abdominal pain, nausea, vomiting, diarrhea. Patient has been having increasingly poor memory as of late as per patient's son and daughter.     In the ED, patient obtained Xray and CT. As per ED attending who received report from radiologist, imaging revealed intertrochanteric fracture.     Patient's daughter, Zoey Nunn phone number is: [phone] Patient is an 88 year old female, lives at home alone, with PMHx of HTN, DM, CAD(s/p stent) presented after a mechanical fall. Patient was outside her apartment and the floors had just been waxed and patient tripped and fell, falling on her side. Patient denied any chest pain, SOB, dizziness, blurry vision at time of fall. Has not had fevers, chills, cough, abdominal pain, nausea, vomiting, diarrhea. Patient has been having increasingly poor memory as of late as per patient's son and daughter.     In the ED, patient obtained Xray and CT. As per ED attending who received report from radiologist, imaging revealed intertrochanteric fracture.     GoC: Patient is an 88 year old female, lives at home alone, with PMHx of HTN, DM, CAD(s/p stent) presented after a mechanical fall. Patient was outside her apartment and the floors had just been waxed and patient tripped and fell, falling on her side. Patient denied any chest pain, SOB, dizziness, blurry vision at time of fall. Has not had fevers, chills, cough, abdominal pain, nausea, vomiting, diarrhea. Patient has been having increasingly poor memory as of late as per patient's son and daughter.     In the ED, patient obtained Xray and CT. As per ED attending who received report from radiologist, imaging revealed intertrochanteric fracture.     GoC: Discussion initiated with patient. Primary team to confirm with patient and children.

## 2023-12-01 NOTE — ED ADULT NURSE NOTE - OBJECTIVE STATEMENT
Pt is a 77yo male PMHx spinal surgery, ankle fracture, anxiety and depression presents to ED c/o hip pain. Pt states, "This morning I woke up on the ground, only memory I have is putting my hands out. Usually when I wake up on the ground with no recollection is at nighttime". Pt reports 10/10 hip pain started after fall, denies head trauma, currently at 7/10 pain. Non-ambulatory, pt is A&Ox4, breathing equal and unlabored, denies c/p, sob, f/c, resting comfortably in stretcher.

## 2023-12-01 NOTE — H&P ADULT - NSHPPHYSICALEXAM_GEN_ALL_CORE
PHYSICAL EXAM  GEN: NAD, Awake and Alert  LLE:  Skin: intact without abrasions / ecchymosis  TTP over hip  Unable to SLR  Pain with log roll  Compartments soft and compressible  + EHL/FHL/TA/GSC  SILT L3-S1  DP +    Secondary  L ankle ttp. Nontender over other bony prominences ranging other extremities appropriately      Vital Signs Last 24 Hrs  T(C): 36.8 (01 Dec 2023 19:08), Max: 37 (01 Dec 2023 13:51)  T(F): 98.2 (01 Dec 2023 19:08), Max: 98.6 (01 Dec 2023 13:51)  HR: 97 (01 Dec 2023 19:08) (80 - 97)  BP: 177/90 (01 Dec 2023 19:08) (171/82 - 177/90)  BP(mean): --  RR: 15 (01 Dec 2023 19:08) (15 - 16)  SpO2: 96% (01 Dec 2023 19:08) (92% - 96%)    Parameters below as of 01 Dec 2023 19:08  Patient On (Oxygen Delivery Method): room air

## 2023-12-01 NOTE — H&P ADULT - HISTORY OF PRESENT ILLNESS
78y Male presents with right / left hip pain. Patient today had a ___ fall. Patient denies headstrike, or LOC. Patient denies any numbness/tingling in the affected extremity. The patient normally ambulates with *** without assistance at baseline. They deny any other orthopedic injuries at this time. The patient normally takes *** for DVT prophylaxis.      PAST MEDICAL & SURGICAL HISTORY:    Home Medications:    Allergies    No Known Allergies    Intolerances                              11.5   9.62  )-----------( 279      ( 01 Dec 2023 15:36 )             34.7     12-01    138  |  101  |  32<H>  ----------------------------<  134<H>  4.0   |  25  |  0.69    Ca    9.2      01 Dec 2023 15:36  Phos  3.2     12-01  Mg     2.0     12-01    TPro  6.3  /  Alb  3.9  /  TBili  0.4  /  DBili  x   /  AST  28  /  ALT  31  /  AlkPhos  105  12-01    PT/INR - ( 01 Dec 2023 15:36 )   PT: 10.5 sec;   INR: 0.92          PTT - ( 01 Dec 2023 15:36 )  PTT:27.9 sec  Urinalysis Basic - ( 01 Dec 2023 17:43 )    Color: Dark Yellow / Appearance: Clear / SG: >1.030 / pH: x  Gluc: x / Ketone: 15 mg/dL  / Bili: Negative / Urobili: 1.0 mg/dL   Blood: x / Protein: 30 mg/dL / Nitrite: Negative   Leuk Esterase: Negative / RBC: 3 /HPF / WBC 1 /HPF   Sq Epi: x / Non Sq Epi: 0 /HPF / Bacteria: Negative /HPF        Vital Signs Last 24 Hrs  T(C): 36.8 (01 Dec 2023 19:08), Max: 37 (01 Dec 2023 13:51)  T(F): 98.2 (01 Dec 2023 19:08), Max: 98.6 (01 Dec 2023 13:51)  HR: 97 (01 Dec 2023 19:08) (80 - 97)  BP: 177/90 (01 Dec 2023 19:08) (171/82 - 177/90)  BP(mean): --  RR: 15 (01 Dec 2023 19:08) (15 - 16)  SpO2: 96% (01 Dec 2023 19:08) (92% - 96%)    Parameters below as of 01 Dec 2023 19:08  Patient On (Oxygen Delivery Method): room air        PHYSICAL EXAM  GEN: NAD, Awake and Alert  RLE: / LLE:  Skin: intact without abrasions / ecchymosis  TTP over hip  Unable to SLR  Pain with log roll  Compartments soft and compressible  + EHL/FHL/TA/GSC  SILT L3-S1  DP +    Secondary:  ***    IMAGING:  XR Right / Left Hip      Assessment/Plan:  78y Male with ____________ .     - plan for OR for operative fixation  - NPO except medications  - IVF while NPO  - NWB RLE / LLE , bedrest  - Please hold chemical DVT ppx after midnight prior to surgery, SCDs okay  - FU Preop labs  - Medical comanagement appreciated, please document clearance for OR  - Will discuss with attending and advise further if plan changes 78y M pmhx depression, Spine surgery with Dr. Schwab, and ankle fracture 6 weeks ago treated nonoperatively presents with left hip pain. Patient today had a mechanical fall today Patient denies headstrike, or LOC. Patient denies any numbness/tingling in the affected extremity. The patient normally ambulates without assistance at baseline. They deny any other orthopedic injuries at this time. Denies taking any blood thinners

## 2023-12-01 NOTE — ED PROVIDER NOTE - CLINICAL SUMMARY MEDICAL DECISION MAKING FREE TEXT BOX
79 y/o M with fall vs syncope. Consider possible medication effects as pt has been taking percocet for lower back pain. Pt sustained hip fracture will likely need hip replacement and admission to ortho telemetry.

## 2023-12-01 NOTE — ED BEHAVIORAL HEALTH ASSESSMENT NOTE - DESCRIPTION
spondylisthesis single, retired Patient evaluated for likely broken hip, awaiting ortho evaluation, given morphine 10 mg in field and hydromorphone 1 mg x2.

## 2023-12-01 NOTE — CONSULT NOTE ADULT - ASSESSMENT
79 yo M with PMHx ___     Medicine consulted on 12/1 PM for pre-operative clearance with plan for daniella-arthroplasty vs total hip arthroplasty on 12/2 with Dr. Moore.    #Pre-operative clearance  XR Hip showing acute Garden type III mildly varus impacted transcervical left femoral neck fracture. Plan for daniella-arthroplasty vs total hip arthroplasty on 12/2 with Dr. Moore. No significant cardiac/pulmonary history. No adverse reactions to anesthesia in the past in self or first-degree relatives.  - Pre-op labs (CBC, CMP, PT, PTT, INR, T/S)  - EKG  - CXR  - METS   - RCRI 0 points (Class I Risk) ~ 3.9% for 30d risk of death, MI, or cardiac arrest.   - Moore score 0.0% for risk of MI or cardiac arrest, intraoperatively or up to 30d post-op.  - Patient deemed _ risk for intermediate risk surgery      #Benzodiazepine use  UTox positive for BZD and opiates on admission.   -     ** Recommendations are final after attending attestation **    INCOMPLETE INCOMPLETE  79 yo M with PMHx HTN, fibromyalgia, BPH, s/p L4 spinal fusion, multiple prior surgeries (R testicle removal, appendectomy, tonsillectomy) BIBEMS for home following fall found to have L-sided femoral neck fracture. Medicine consulted on 12/1 PM for pre-operative clearance with plan for daniella-arthroplasty vs total hip arthroplasty on 12/2 with Dr. Moore.    #Pre-operative clearance  XR Hip showing acute Garden type III mildly varus impacted transcervical left femoral neck fracture. Plan for daniella-arthroplasty vs total hip arthroplasty on 12/2 with Dr. Moore. No significant cardiac hx apart from HTN. Has had cardiac stress testing/TTE prior which pt self reports has been normal. Denies palpitations, chest pain, SOB. No limitations with physical activity 2/2 cardiac complaints. No hx of asthma, COPD, FELIBERTO. No issues with ventilatory support in the past. No adverse reactions to anesthesia in the past in self or first-degree relatives. No allergies to medications.   - Pre-op labs (CBC, CMP, PT, PTT, INR, T/S)  - EKG NSR with  without acute ischemic changes   - CXR 12/1 showing cardiomegaly, no consolidations/effusions   - METS >4 (Can walk multiple city blocks without stopping due to SOB/chest pain, climb ~1 flight of stairs, does not use cane/walker at baseline)   - RCRI 0 points (Class I Risk) ~ 3.9% for 30d risk of death, MI, or cardiac arrest.   - Moore score 0.0% for risk of MI or cardiac arrest, intraoperatively or up to 30d post-op.  - Patient deemed low risk for intermediate risk surgery      #Benzodiazepine use  UTox positive for BZD and opiates on admission. Pt reports only taking Xanax 0.5mg PRN Qhs for sleep/anxiety. Denies any other BZD use. CIWA 0.   - OK to continue Xanax 0.5mg Qhs     #HTN  Pt reports hx of HTN, on unknown medication at home, possibly ACEi/ARB. BPs this admission elevated likely in setting of pain.   - Hold ACEi/ARB nigel-operatively    ** Recommendations are final after attending attestation **   77 yo M with PMHx HTN, fibromyalgia, BPH, s/p L4 spinal fusion, multiple prior surgeries (R testicle removal, appendectomy, tonsillectomy) BIBEMS for home following fall found to have L-sided femoral neck fracture. Medicine consulted on 12/1 PM for pre-operative clearance with plan for daniella-arthroplasty vs total hip arthroplasty on 12/2 with Dr. Moore    #Pre-operative clearance  XR Hip showing acute Garden type III mildly varus impacted transcervical left femoral neck fracture. Plan for daniella-arthroplasty vs total hip arthroplasty on 12/2 with Dr. Moore. No significant cardiac hx apart from HTN. Has had cardiac stress testing/TTE prior which pt self reports has been normal. Denies palpitations, chest pain, SOB. No limitations with physical activity 2/2 cardiac complaints. No hx of asthma, COPD, FELIBERTO. No issues with ventilatory support in the past. No adverse reactions to anesthesia in the past in self or first-degree relatives. No allergies to medications.   - Pre-op labs (CBC, CMP, PT, PTT, INR, T/S)  - EKG NSR with  without acute ischemic changes   - CXR 12/1 showing cardiomegaly, no consolidations/effusions   - METS >4 (Can walk multiple city blocks without stopping due to SOB/chest pain, climb ~1 flight of stairs, does not use cane/walker at baseline)   - RCRI 0 points (Class I Risk) ~ 3.9% for 30d risk of death, MI, or cardiac arrest.   - Moore score 0.0% for risk of MI or cardiac arrest, intraoperatively or up to 30d post-op.  - Patient deemed low risk for intermediate risk surgery      #Benzodiazepine use  UTox positive for BZD and opiates on admission. Pt reports only taking Xanax 0.5mg PRN Qhs for sleep/anxiety. Denies any other BZD use. CIWA 0.   - OK to continue Xanax 0.5mg Qhs     #HTN  Pt reports hx of HTN, on unknown medication at home, possibly ACEi/ARB. BPs this admission elevated likely in setting of pain.   - Hold ACEi/ARB nigel-operatively    ** Recommendations are final after attending attestation **

## 2023-12-01 NOTE — ED PROVIDER NOTE - PHYSICAL EXAMINATION
VITAL SIGNS: I have reviewed nursing notes and confirm.  CONSTITUTIONAL: Well-developed; well-nourished; in no acute distress.  SKIN: Agree with RN documentation regarding decubitus evaluation. Remainder of skin exam is warm and dry, no acute rash.  HEAD: Normocephalic; atraumatic.  EYES: PERRL, EOM intact; conjunctiva and sclera clear.  ENT: No nasal discharge; airway clear.  NECK: Supple; non tender.  CARD: S1, S2 normal; no murmurs, gallops, or rubs. Regular rate and rhythm.  RESP: No wheezes, rales or rhonchi.  ABD: Normal bowel sounds; soft; non-distended; non-tender; no hepatosplenomegaly.  EXT: No clubbing, cyanosis or edema. Tenderness over left hip, externally rotated and shortened with palpable pulses. Distal sensation intact.   LYMPH: No acute cervical adenopathy.  NEURO: Alert, oriented. Grossly unremarkable.  PSYCH: Cooperative, appropriate.

## 2023-12-02 LAB
ANION GAP SERPL CALC-SCNC: 10 MMOL/L — SIGNIFICANT CHANGE UP (ref 5–17)
ANION GAP SERPL CALC-SCNC: 10 MMOL/L — SIGNIFICANT CHANGE UP (ref 5–17)
BASOPHILS # BLD AUTO: 0.04 K/UL — SIGNIFICANT CHANGE UP (ref 0–0.2)
BASOPHILS # BLD AUTO: 0.04 K/UL — SIGNIFICANT CHANGE UP (ref 0–0.2)
BASOPHILS NFR BLD AUTO: 0.5 % — SIGNIFICANT CHANGE UP (ref 0–2)
BASOPHILS NFR BLD AUTO: 0.5 % — SIGNIFICANT CHANGE UP (ref 0–2)
BLD GP AB SCN SERPL QL: NEGATIVE — SIGNIFICANT CHANGE UP
BLD GP AB SCN SERPL QL: NEGATIVE — SIGNIFICANT CHANGE UP
BUN SERPL-MCNC: 20 MG/DL — SIGNIFICANT CHANGE UP (ref 7–23)
BUN SERPL-MCNC: 20 MG/DL — SIGNIFICANT CHANGE UP (ref 7–23)
CALCIUM SERPL-MCNC: 8.4 MG/DL — SIGNIFICANT CHANGE UP (ref 8.4–10.5)
CALCIUM SERPL-MCNC: 8.4 MG/DL — SIGNIFICANT CHANGE UP (ref 8.4–10.5)
CALCIUM SERPL-MCNC: 8.7 MG/DL — SIGNIFICANT CHANGE UP (ref 8.4–10.5)
CALCIUM SERPL-MCNC: 8.7 MG/DL — SIGNIFICANT CHANGE UP (ref 8.4–10.5)
CHLORIDE SERPL-SCNC: 100 MMOL/L — SIGNIFICANT CHANGE UP (ref 96–108)
CHLORIDE SERPL-SCNC: 100 MMOL/L — SIGNIFICANT CHANGE UP (ref 96–108)
CO2 SERPL-SCNC: 25 MMOL/L — SIGNIFICANT CHANGE UP (ref 22–31)
CO2 SERPL-SCNC: 25 MMOL/L — SIGNIFICANT CHANGE UP (ref 22–31)
CREAT SERPL-MCNC: 0.62 MG/DL — SIGNIFICANT CHANGE UP (ref 0.5–1.3)
CREAT SERPL-MCNC: 0.62 MG/DL — SIGNIFICANT CHANGE UP (ref 0.5–1.3)
EGFR: 98 ML/MIN/1.73M2 — SIGNIFICANT CHANGE UP
EGFR: 98 ML/MIN/1.73M2 — SIGNIFICANT CHANGE UP
EOSINOPHIL # BLD AUTO: 0.09 K/UL — SIGNIFICANT CHANGE UP (ref 0–0.5)
EOSINOPHIL # BLD AUTO: 0.09 K/UL — SIGNIFICANT CHANGE UP (ref 0–0.5)
EOSINOPHIL NFR BLD AUTO: 1.2 % — SIGNIFICANT CHANGE UP (ref 0–6)
EOSINOPHIL NFR BLD AUTO: 1.2 % — SIGNIFICANT CHANGE UP (ref 0–6)
GLUCOSE SERPL-MCNC: 121 MG/DL — HIGH (ref 70–99)
GLUCOSE SERPL-MCNC: 121 MG/DL — HIGH (ref 70–99)
HCT VFR BLD CALC: 33.5 % — LOW (ref 39–50)
HCT VFR BLD CALC: 33.5 % — LOW (ref 39–50)
HCV AB S/CO SERPL IA: 0.04 S/CO — SIGNIFICANT CHANGE UP
HCV AB S/CO SERPL IA: 0.04 S/CO — SIGNIFICANT CHANGE UP
HCV AB SERPL-IMP: SIGNIFICANT CHANGE UP
HCV AB SERPL-IMP: SIGNIFICANT CHANGE UP
HGB BLD-MCNC: 10.8 G/DL — LOW (ref 13–17)
HGB BLD-MCNC: 10.8 G/DL — LOW (ref 13–17)
IMM GRANULOCYTES NFR BLD AUTO: 0.4 % — SIGNIFICANT CHANGE UP (ref 0–0.9)
IMM GRANULOCYTES NFR BLD AUTO: 0.4 % — SIGNIFICANT CHANGE UP (ref 0–0.9)
LYMPHOCYTES # BLD AUTO: 0.5 K/UL — LOW (ref 1–3.3)
LYMPHOCYTES # BLD AUTO: 0.5 K/UL — LOW (ref 1–3.3)
LYMPHOCYTES # BLD AUTO: 6.5 % — LOW (ref 13–44)
LYMPHOCYTES # BLD AUTO: 6.5 % — LOW (ref 13–44)
MCHC RBC-ENTMCNC: 28.1 PG — SIGNIFICANT CHANGE UP (ref 27–34)
MCHC RBC-ENTMCNC: 28.1 PG — SIGNIFICANT CHANGE UP (ref 27–34)
MCHC RBC-ENTMCNC: 32.2 GM/DL — SIGNIFICANT CHANGE UP (ref 32–36)
MCHC RBC-ENTMCNC: 32.2 GM/DL — SIGNIFICANT CHANGE UP (ref 32–36)
MCV RBC AUTO: 87 FL — SIGNIFICANT CHANGE UP (ref 80–100)
MCV RBC AUTO: 87 FL — SIGNIFICANT CHANGE UP (ref 80–100)
MONOCYTES # BLD AUTO: 0.87 K/UL — SIGNIFICANT CHANGE UP (ref 0–0.9)
MONOCYTES # BLD AUTO: 0.87 K/UL — SIGNIFICANT CHANGE UP (ref 0–0.9)
MONOCYTES NFR BLD AUTO: 11.3 % — SIGNIFICANT CHANGE UP (ref 2–14)
MONOCYTES NFR BLD AUTO: 11.3 % — SIGNIFICANT CHANGE UP (ref 2–14)
NEUTROPHILS # BLD AUTO: 6.14 K/UL — SIGNIFICANT CHANGE UP (ref 1.8–7.4)
NEUTROPHILS # BLD AUTO: 6.14 K/UL — SIGNIFICANT CHANGE UP (ref 1.8–7.4)
NEUTROPHILS NFR BLD AUTO: 80.1 % — HIGH (ref 43–77)
NEUTROPHILS NFR BLD AUTO: 80.1 % — HIGH (ref 43–77)
NRBC # BLD: 0 /100 WBCS — SIGNIFICANT CHANGE UP (ref 0–0)
NRBC # BLD: 0 /100 WBCS — SIGNIFICANT CHANGE UP (ref 0–0)
PLATELET # BLD AUTO: 246 K/UL — SIGNIFICANT CHANGE UP (ref 150–400)
PLATELET # BLD AUTO: 246 K/UL — SIGNIFICANT CHANGE UP (ref 150–400)
POTASSIUM SERPL-MCNC: 4 MMOL/L — SIGNIFICANT CHANGE UP (ref 3.5–5.3)
POTASSIUM SERPL-MCNC: 4 MMOL/L — SIGNIFICANT CHANGE UP (ref 3.5–5.3)
POTASSIUM SERPL-SCNC: 4 MMOL/L — SIGNIFICANT CHANGE UP (ref 3.5–5.3)
POTASSIUM SERPL-SCNC: 4 MMOL/L — SIGNIFICANT CHANGE UP (ref 3.5–5.3)
PTH-INTACT FLD-MCNC: 48 PG/ML — SIGNIFICANT CHANGE UP (ref 15–65)
PTH-INTACT FLD-MCNC: 48 PG/ML — SIGNIFICANT CHANGE UP (ref 15–65)
RBC # BLD: 3.85 M/UL — LOW (ref 4.2–5.8)
RBC # BLD: 3.85 M/UL — LOW (ref 4.2–5.8)
RBC # FLD: 13.4 % — SIGNIFICANT CHANGE UP (ref 10.3–14.5)
RBC # FLD: 13.4 % — SIGNIFICANT CHANGE UP (ref 10.3–14.5)
RH IG SCN BLD-IMP: POSITIVE — SIGNIFICANT CHANGE UP
RH IG SCN BLD-IMP: POSITIVE — SIGNIFICANT CHANGE UP
SODIUM SERPL-SCNC: 135 MMOL/L — SIGNIFICANT CHANGE UP (ref 135–145)
SODIUM SERPL-SCNC: 135 MMOL/L — SIGNIFICANT CHANGE UP (ref 135–145)
T4 AB SER-ACNC: 11.84 UG/DL — HIGH (ref 4.5–11.7)
T4 AB SER-ACNC: 11.84 UG/DL — HIGH (ref 4.5–11.7)
TSH SERPL-MCNC: 3.54 UIU/ML — SIGNIFICANT CHANGE UP (ref 0.27–4.2)
TSH SERPL-MCNC: 3.54 UIU/ML — SIGNIFICANT CHANGE UP (ref 0.27–4.2)
WBC # BLD: 7.67 K/UL — SIGNIFICANT CHANGE UP (ref 3.8–10.5)
WBC # BLD: 7.67 K/UL — SIGNIFICANT CHANGE UP (ref 3.8–10.5)
WBC # FLD AUTO: 7.67 K/UL — SIGNIFICANT CHANGE UP (ref 3.8–10.5)
WBC # FLD AUTO: 7.67 K/UL — SIGNIFICANT CHANGE UP (ref 3.8–10.5)

## 2023-12-02 PROCEDURE — 73700 CT LOWER EXTREMITY W/O DYE: CPT | Mod: 26,LT

## 2023-12-02 DEVICE — IMPLANTABLE DEVICE: Type: IMPLANTABLE DEVICE | Status: FUNCTIONAL

## 2023-12-02 DEVICE — HEAD FEM COCR 12/14 TAPER 28MM PLUS 0MM: Type: IMPLANTABLE DEVICE | Status: FUNCTIONAL

## 2023-12-02 DEVICE — STEM FEM AVENIR CMPL HA STD COL SZ 6: Type: IMPLANTABLE DEVICE | Status: FUNCTIONAL

## 2023-12-02 RX ORDER — HYDROMORPHONE HYDROCHLORIDE 2 MG/ML
0.5 INJECTION INTRAMUSCULAR; INTRAVENOUS; SUBCUTANEOUS
Refills: 0 | Status: DISCONTINUED | OUTPATIENT
Start: 2023-12-02 | End: 2023-12-05

## 2023-12-02 RX ORDER — SODIUM CHLORIDE 9 MG/ML
1000 INJECTION, SOLUTION INTRAVENOUS
Refills: 0 | Status: DISCONTINUED | OUTPATIENT
Start: 2023-12-02 | End: 2023-12-02

## 2023-12-02 RX ORDER — POLYETHYLENE GLYCOL 3350 17 G/17G
17 POWDER, FOR SOLUTION ORAL AT BEDTIME
Refills: 0 | Status: DISCONTINUED | OUTPATIENT
Start: 2023-12-02 | End: 2023-12-06

## 2023-12-02 RX ORDER — ASPIRIN/CALCIUM CARB/MAGNESIUM 324 MG
81 TABLET ORAL
Refills: 0 | Status: DISCONTINUED | OUTPATIENT
Start: 2023-12-03 | End: 2023-12-06

## 2023-12-02 RX ORDER — ACETAMINOPHEN 500 MG
1000 TABLET ORAL EVERY 8 HOURS
Refills: 0 | Status: DISCONTINUED | OUTPATIENT
Start: 2023-12-02 | End: 2023-12-06

## 2023-12-02 RX ORDER — HYDROMORPHONE HYDROCHLORIDE 2 MG/ML
0.5 INJECTION INTRAMUSCULAR; INTRAVENOUS; SUBCUTANEOUS EVERY 4 HOURS
Refills: 0 | Status: DISCONTINUED | OUTPATIENT
Start: 2023-12-02 | End: 2023-12-06

## 2023-12-02 RX ORDER — SODIUM CHLORIDE 9 MG/ML
1000 INJECTION, SOLUTION INTRAVENOUS
Refills: 0 | Status: DISCONTINUED | OUTPATIENT
Start: 2023-12-03 | End: 2023-12-06

## 2023-12-02 RX ORDER — CEFAZOLIN SODIUM 1 G
2000 VIAL (EA) INJECTION EVERY 8 HOURS
Refills: 0 | Status: COMPLETED | OUTPATIENT
Start: 2023-12-02 | End: 2023-12-03

## 2023-12-02 RX ORDER — FOLIC ACID 0.8 MG
1 TABLET ORAL DAILY
Refills: 0 | Status: DISCONTINUED | OUTPATIENT
Start: 2023-12-02 | End: 2023-12-06

## 2023-12-02 RX ORDER — HYDROMORPHONE HYDROCHLORIDE 2 MG/ML
2 INJECTION INTRAMUSCULAR; INTRAVENOUS; SUBCUTANEOUS EVERY 4 HOURS
Refills: 0 | Status: DISCONTINUED | OUTPATIENT
Start: 2023-12-02 | End: 2023-12-06

## 2023-12-02 RX ORDER — KETOROLAC TROMETHAMINE 30 MG/ML
15 SYRINGE (ML) INJECTION EVERY 6 HOURS
Refills: 0 | Status: DISCONTINUED | OUTPATIENT
Start: 2023-12-02 | End: 2023-12-03

## 2023-12-02 RX ORDER — ACETAMINOPHEN 500 MG
1000 TABLET ORAL EVERY 8 HOURS
Refills: 0 | Status: DISCONTINUED | OUTPATIENT
Start: 2023-12-02 | End: 2023-12-02

## 2023-12-02 RX ORDER — PANTOPRAZOLE SODIUM 20 MG/1
40 TABLET, DELAYED RELEASE ORAL
Refills: 0 | Status: DISCONTINUED | OUTPATIENT
Start: 2023-12-02 | End: 2023-12-06

## 2023-12-02 RX ORDER — SENNA PLUS 8.6 MG/1
2 TABLET ORAL AT BEDTIME
Refills: 0 | Status: DISCONTINUED | OUTPATIENT
Start: 2023-12-02 | End: 2023-12-06

## 2023-12-02 RX ORDER — ONDANSETRON 8 MG/1
4 TABLET, FILM COATED ORAL EVERY 6 HOURS
Refills: 0 | Status: DISCONTINUED | OUTPATIENT
Start: 2023-12-02 | End: 2023-12-06

## 2023-12-02 RX ORDER — HYDROMORPHONE HYDROCHLORIDE 2 MG/ML
4 INJECTION INTRAMUSCULAR; INTRAVENOUS; SUBCUTANEOUS EVERY 4 HOURS
Refills: 0 | Status: DISCONTINUED | OUTPATIENT
Start: 2023-12-02 | End: 2023-12-06

## 2023-12-02 RX ORDER — MAGNESIUM HYDROXIDE 400 MG/1
30 TABLET, CHEWABLE ORAL DAILY
Refills: 0 | Status: DISCONTINUED | OUTPATIENT
Start: 2023-12-02 | End: 2023-12-06

## 2023-12-02 RX ORDER — INFLUENZA VIRUS VACCINE 15; 15; 15; 15 UG/.5ML; UG/.5ML; UG/.5ML; UG/.5ML
0.7 SUSPENSION INTRAMUSCULAR ONCE
Refills: 0 | Status: DISCONTINUED | OUTPATIENT
Start: 2023-12-02 | End: 2023-12-02

## 2023-12-02 RX ADMIN — SENNA PLUS 2 TABLET(S): 8.6 TABLET ORAL at 21:43

## 2023-12-02 RX ADMIN — Medication 100 MILLIGRAM(S): at 22:03

## 2023-12-02 RX ADMIN — POLYETHYLENE GLYCOL 3350 17 GRAM(S): 17 POWDER, FOR SOLUTION ORAL at 21:44

## 2023-12-02 RX ADMIN — SODIUM CHLORIDE 100 MILLILITER(S): 9 INJECTION, SOLUTION INTRAVENOUS at 00:20

## 2023-12-02 RX ADMIN — Medication 15 MILLIGRAM(S): at 23:31

## 2023-12-02 RX ADMIN — HYDROMORPHONE HYDROCHLORIDE 0.5 MILLIGRAM(S): 2 INJECTION INTRAMUSCULAR; INTRAVENOUS; SUBCUTANEOUS at 12:58

## 2023-12-02 RX ADMIN — HYDROMORPHONE HYDROCHLORIDE 0.5 MILLIGRAM(S): 2 INJECTION INTRAMUSCULAR; INTRAVENOUS; SUBCUTANEOUS at 04:08

## 2023-12-02 RX ADMIN — OXYCODONE HYDROCHLORIDE 10 MILLIGRAM(S): 5 TABLET ORAL at 10:00

## 2023-12-02 RX ADMIN — Medication 1000 MILLIGRAM(S): at 21:43

## 2023-12-02 RX ADMIN — OXYCODONE HYDROCHLORIDE 10 MILLIGRAM(S): 5 TABLET ORAL at 08:58

## 2023-12-02 RX ADMIN — OXYCODONE HYDROCHLORIDE 10 MILLIGRAM(S): 5 TABLET ORAL at 02:18

## 2023-12-02 RX ADMIN — Medication 1000 MILLIGRAM(S): at 13:18

## 2023-12-02 RX ADMIN — OXYCODONE HYDROCHLORIDE 10 MILLIGRAM(S): 5 TABLET ORAL at 03:14

## 2023-12-02 RX ADMIN — HYDROMORPHONE HYDROCHLORIDE 0.5 MILLIGRAM(S): 2 INJECTION INTRAMUSCULAR; INTRAVENOUS; SUBCUTANEOUS at 03:14

## 2023-12-02 RX ADMIN — HYDROMORPHONE HYDROCHLORIDE 0.5 MILLIGRAM(S): 2 INJECTION INTRAMUSCULAR; INTRAVENOUS; SUBCUTANEOUS at 13:30

## 2023-12-02 RX ADMIN — Medication 1000 MILLIGRAM(S): at 05:23

## 2023-12-02 RX ADMIN — FAMOTIDINE 20 MILLIGRAM(S): 10 INJECTION INTRAVENOUS at 05:25

## 2023-12-02 NOTE — PROGRESS NOTE ADULT - SUBJECTIVE AND OBJECTIVE BOX
Ortho Note    Pt comfortable without complaints, pain controlled  Denies CP, SOB, N/V, numbness/tingling. Pending OR    Vital Signs Last 24 Hrs  T(C): 37.1 (12-02-23 @ 06:32), Max: 37.8 (12-02-23 @ 05:20)  T(F): 98.8 (12-02-23 @ 06:32), Max: 100 (12-02-23 @ 05:20)  HR: 89 (12-02-23 @ 05:20) (89 - 90)  BP: 173/85 (12-02-23 @ 05:20) (158/85 - 173/85)  BP(mean): --  RR: 18 (12-02-23 @ 05:20) (18 - 18)  SpO2: 94% (12-02-23 @ 05:20) (93% - 94%)  I&O's Summary    01 Dec 2023 07:01  -  02 Dec 2023 06:47  --------------------------------------------------------  IN: 600 mL / OUT: 1400 mL / NET: -800 mL        GEN: NAD, Awake and Alert  LLE:  Skin: intact without abrasions / ecchymosis  TTP over hip  Unable to SLR  Pain with log roll  Compartments soft and compressible  + EHL/FHL/TA/GSC  SILT L3-S1  DP +    Secondary  L ankle ttp. Nontender over other bony prominences ranging other extremities appropriately                          11.5   9.62  )-----------( 279      ( 01 Dec 2023 15:36 )             34.7     12-01    138  |  101  |  32<H>  ----------------------------<  134<H>  4.0   |  25  |  0.69    Ca    9.2      01 Dec 2023 15:36  Phos  3.2     12-01  Mg     2.0     12-01    TPro  6.3  /  Alb  3.9  /  TBili  0.4  /  DBili  x   /  AST  28  /  ALT  31  /  AlkPhos  105  12-01      78y Male with L femoral neck fracture  - Admit, plan for OR for operative fixation on 12/2  - NPO at midnight, IVF while NPO  - NWB LLE , bedrest  - SCDs  - FU Preop labs  - F/u L ankle XR  - Medical comanagement appreciated, please document clearance for OR    Ortho Pager [phone]

## 2023-12-02 NOTE — PATIENT PROFILE ADULT - FUNCTIONAL ASSESSMENT - BASIC MOBILITY 6.
1-calculated by average/Not able to assess (calculate score using [facility] averaging method)

## 2023-12-02 NOTE — PROGRESS NOTE ADULT - SUBJECTIVE AND OBJECTIVE BOX
Ortho Post Op Check    Procedure: L hip daniella  Surgeon: Dr. Moore    Patient seen and examined at bedside.  Pt comfortable without complaints, pain controlled  Denies CP, SOB, N/V, numbness/tingling     Vital Signs Last 24 Hrs  T(C): 36.9 (12-02-23 @ 23:28), Max: 37.1 (12-02-23 @ 20:14)  T(F): 98.4 (12-02-23 @ 23:28), Max: 98.8 (12-02-23 @ 20:14)  HR: 81 (12-02-23 @ 23:28) (60 - 81)  BP: 159/77 (12-02-23 @ 23:28) (116/70 - 159/77)  BP(mean): 90 (12-02-23 @ 19:30) (88 - 91)  RR: 18 (12-02-23 @ 23:28) (18 - 19)  SpO2: 99% (12-02-23 @ 23:28) (94% - 99%)  I&O's Summary    01 Dec 2023 07:01  -  02 Dec 2023 07:00  --------------------------------------------------------  IN: 800 mL / OUT: 1400 mL / NET: -600 mL    02 Dec 2023 07:01  -  02 Dec 2023 23:49  --------------------------------------------------------  IN: 1050 mL / OUT: 620 mL / NET: 430 mL        General: Pt Alert and oriented, NAD    LLE  DSG C/D/I - Aquacel  Pulses: 2+ DP  Sensation: SILT  Motor: 5/5 EHL/FHL/TA/GS  Placed in cam boot                          10.8   7.67  )-----------( 246      ( 02 Dec 2023 06:47 )             33.5     12-02    135  |  100  |  20  ----------------------------<  121<H>  4.0   |  25  |  0.62    Ca    8.4      02 Dec 2023 06:47  Phos  3.2     12-01  Mg     2.0     12-01    TPro  6.3  /  Alb  3.9  /  TBili  0.4  /  DBili  x   /  AST  28  /  ALT  31  /  AlkPhos  105  12-01      Post-op X-Ray: Hardware intact on fluoro    A/P: 78yMale POD#0 s/p L hip daniella  - Stable  - Pain Control  - DVT ppx: ASA 81  - Post op abx: Ancef  - PT, WBS: WBAT  Dispo - pending PT    Ortho Pager [phone] Ortho Post Op Check    Procedure: L hip daniella  Surgeon: Dr. Moore    Patient seen and examined at bedside.  Pt comfortable without complaints, pain controlled  Denies CP, SOB, N/V, numbness/tingling     Vital Signs Last 24 Hrs  T(C): 36.9 (12-02-23 @ 23:28), Max: 37.1 (12-02-23 @ 20:14)  T(F): 98.4 (12-02-23 @ 23:28), Max: 98.8 (12-02-23 @ 20:14)  HR: 81 (12-02-23 @ 23:28) (60 - 81)  BP: 159/77 (12-02-23 @ 23:28) (116/70 - 159/77)  BP(mean): 90 (12-02-23 @ 19:30) (88 - 91)  RR: 18 (12-02-23 @ 23:28) (18 - 19)  SpO2: 99% (12-02-23 @ 23:28) (94% - 99%)  I&O's Summary    01 Dec 2023 07:01  -  02 Dec 2023 07:00  --------------------------------------------------------  IN: 800 mL / OUT: 1400 mL / NET: -600 mL    02 Dec 2023 07:01  -  02 Dec 2023 23:49  --------------------------------------------------------  IN: 1050 mL / OUT: 620 mL / NET: 430 mL        General: Pt Alert and oriented, NAD    LLE  DSG C/D/I - Aquacel  Pulses: 2+ DP  Sensation: SILT  Motor: 5/5 EHL/FHL/TA/GS  Placed in cam boot                          10.8   7.67  )-----------( 246      ( 02 Dec 2023 06:47 )             33.5     12-02    135  |  100  |  20  ----------------------------<  121<H>  4.0   |  25  |  0.62    Ca    8.4      02 Dec 2023 06:47  Phos  3.2     12-01  Mg     2.0     12-01    TPro  6.3  /  Alb  3.9  /  TBili  0.4  /  DBili  x   /  AST  28  /  ALT  31  /  AlkPhos  105  12-01      Post-op X-Ray: Hardware intact on fluoro    A/P: 78yMale POD#0 s/p L hip daniella  - Stable  - Pain Control  - DVT ppx: ASA 81  - Post op abx: Ancef  - PT, WBS: WBAT  Dispo - pending PT    Ortho Pager [phone] Ortho Post Op Check    Procedure: L hip daniella  Surgeon: Dr. Moore    Patient seen and examined at bedside.  Pt comfortable without complaints, pain controlled  Denies CP, SOB, N/V, numbness/tingling     Vital Signs Last 24 Hrs  T(C): 36.9 (12-02-23 @ 23:28), Max: 37.1 (12-02-23 @ 20:14)  T(F): 98.4 (12-02-23 @ 23:28), Max: 98.8 (12-02-23 @ 20:14)  HR: 81 (12-02-23 @ 23:28) (60 - 81)  BP: 159/77 (12-02-23 @ 23:28) (116/70 - 159/77)  BP(mean): 90 (12-02-23 @ 19:30) (88 - 91)  RR: 18 (12-02-23 @ 23:28) (18 - 19)  SpO2: 99% (12-02-23 @ 23:28) (94% - 99%)  I&O's Summary    01 Dec 2023 07:01  -  02 Dec 2023 07:00  --------------------------------------------------------  IN: 800 mL / OUT: 1400 mL / NET: -600 mL    02 Dec 2023 07:01  -  02 Dec 2023 23:49  --------------------------------------------------------  IN: 1050 mL / OUT: 620 mL / NET: 430 mL        General: Pt Alert and oriented, NAD    LLE  DSG C/D/I - Aquacel  Pulses: 2+ DP  Sensation: SILT  Motor: 5/5 EHL/FHL/TA/GS  Placed in cam boot                          10.8   7.67  )-----------( 246      ( 02 Dec 2023 06:47 )             33.5     12-02    135  |  100  |  20  ----------------------------<  121<H>  4.0   |  25  |  0.62    Ca    8.4      02 Dec 2023 06:47  Phos  3.2     12-01  Mg     2.0     12-01    TPro  6.3  /  Alb  3.9  /  TBili  0.4  /  DBili  x   /  AST  28  /  ALT  31  /  AlkPhos  105  12-01      Post-op X-Ray: Hardware intact on fluoro    A/P: 78yMale POD#0 s/p L hip daniella  - Stable  - Pain Control  - DVT ppx: ASA 81  - Post op abx: Ancef  - PT, WBS: WBAT  Dispo - pending PT    Ortho Pager [phone]

## 2023-12-02 NOTE — PATIENT PROFILE ADULT - FALL HARM RISK - HARM RISK INTERVENTIONS

## 2023-12-02 NOTE — PRE-ANESTHESIA EVALUATION ADULT - NSANTHPMHFT_GEN_ALL_CORE
78M PMH HTN, fibromyalgia, BPH, s/p L4 spinal fusion, multiple prior surgeries (R testicle removal, appendectomy, tonsillectomy) BIBEMS for home following fall found to have L-sided femoral neck fracture. 78M PMH HTN, fibromyalgia, BPH, s/p L4 spinal fusion, multiple prior surgeries (R testicle removal, appendectomy, tonsillectomy) BIBEMS for home following fall found to have L-sided femoral neck fracture.  METS>4

## 2023-12-02 NOTE — ANESTHESIA FOLLOW-UP NOTE - ANESTHESIOLOGIST NAME
Dr. Parikh [[email or web address]] : renal consult\par urology fu as directed \par blood in urine \par 3 mo fu sooner as needed.\par

## 2023-12-02 NOTE — PRE-ANESTHESIA EVALUATION ADULT - NSANTHOSAYNRD_GEN_A_CORE
No. FELIBERTO screening performed.  STOP BANG Legend: 0-2 = LOW Risk; 3-4 = INTERMEDIATE Risk; 5-8 = HIGH Risk

## 2023-12-02 NOTE — PATIENT PROFILE ADULT - HEALTH LITERACY
"Goal Outcome Evaluation:              Outcome Evaluation: A&Ox4. Rapid Response called due to unresponsive episode with oxygen saturation dropping to 87%. on Bipap. Other vitals stable at this time. Pt repeately verbalizes a sense of impending doom or \"something is going to happen\", PRN Xanax given.  Unable to complete MRI, O2 dropped to 84% while on NRB in MRI . FSBG q3hr, improved this shift. IV fluids infusing. No other events overnight.  " no

## 2023-12-02 NOTE — PATIENT PROFILE ADULT - ARE SIGNIFICANT INDICATORS COMPLETE.
Spoke with father and he agreed verbally to new date and appointment time for appointment. Due to Book out.   
Yes

## 2023-12-02 NOTE — PROVIDER CONTACT NOTE (OTHER) - SITUATION
Pt had temperature of 100 at 5:20 AM. Highest reading was 100.9. Gave pt 6 AM Tylenol. At 6:20 AM, retook temperature and it was 98.8.

## 2023-12-03 LAB
ANION GAP SERPL CALC-SCNC: 10 MMOL/L — SIGNIFICANT CHANGE UP (ref 5–17)
ANION GAP SERPL CALC-SCNC: 10 MMOL/L — SIGNIFICANT CHANGE UP (ref 5–17)
BUN SERPL-MCNC: 27 MG/DL — HIGH (ref 7–23)
BUN SERPL-MCNC: 27 MG/DL — HIGH (ref 7–23)
CALCIUM SERPL-MCNC: 8 MG/DL — LOW (ref 8.4–10.5)
CALCIUM SERPL-MCNC: 8 MG/DL — LOW (ref 8.4–10.5)
CHLORIDE SERPL-SCNC: 101 MMOL/L — SIGNIFICANT CHANGE UP (ref 96–108)
CHLORIDE SERPL-SCNC: 101 MMOL/L — SIGNIFICANT CHANGE UP (ref 96–108)
CO2 SERPL-SCNC: 26 MMOL/L — SIGNIFICANT CHANGE UP (ref 22–31)
CO2 SERPL-SCNC: 26 MMOL/L — SIGNIFICANT CHANGE UP (ref 22–31)
CREAT SERPL-MCNC: 0.86 MG/DL — SIGNIFICANT CHANGE UP (ref 0.5–1.3)
CREAT SERPL-MCNC: 0.86 MG/DL — SIGNIFICANT CHANGE UP (ref 0.5–1.3)
CULTURE RESULTS: NO GROWTH — SIGNIFICANT CHANGE UP
CULTURE RESULTS: NO GROWTH — SIGNIFICANT CHANGE UP
EGFR: 89 ML/MIN/1.73M2 — SIGNIFICANT CHANGE UP
EGFR: 89 ML/MIN/1.73M2 — SIGNIFICANT CHANGE UP
GLUCOSE SERPL-MCNC: 113 MG/DL — HIGH (ref 70–99)
GLUCOSE SERPL-MCNC: 113 MG/DL — HIGH (ref 70–99)
HCT VFR BLD CALC: 31 % — LOW (ref 39–50)
HCT VFR BLD CALC: 31 % — LOW (ref 39–50)
HGB BLD-MCNC: 10 G/DL — LOW (ref 13–17)
HGB BLD-MCNC: 10 G/DL — LOW (ref 13–17)
MCHC RBC-ENTMCNC: 28.4 PG — SIGNIFICANT CHANGE UP (ref 27–34)
MCHC RBC-ENTMCNC: 28.4 PG — SIGNIFICANT CHANGE UP (ref 27–34)
MCHC RBC-ENTMCNC: 32.3 GM/DL — SIGNIFICANT CHANGE UP (ref 32–36)
MCHC RBC-ENTMCNC: 32.3 GM/DL — SIGNIFICANT CHANGE UP (ref 32–36)
MCV RBC AUTO: 88.1 FL — SIGNIFICANT CHANGE UP (ref 80–100)
MCV RBC AUTO: 88.1 FL — SIGNIFICANT CHANGE UP (ref 80–100)
NRBC # BLD: 0 /100 WBCS — SIGNIFICANT CHANGE UP (ref 0–0)
NRBC # BLD: 0 /100 WBCS — SIGNIFICANT CHANGE UP (ref 0–0)
PLATELET # BLD AUTO: 232 K/UL — SIGNIFICANT CHANGE UP (ref 150–400)
PLATELET # BLD AUTO: 232 K/UL — SIGNIFICANT CHANGE UP (ref 150–400)
POTASSIUM SERPL-MCNC: 4.1 MMOL/L — SIGNIFICANT CHANGE UP (ref 3.5–5.3)
POTASSIUM SERPL-MCNC: 4.1 MMOL/L — SIGNIFICANT CHANGE UP (ref 3.5–5.3)
POTASSIUM SERPL-SCNC: 4.1 MMOL/L — SIGNIFICANT CHANGE UP (ref 3.5–5.3)
POTASSIUM SERPL-SCNC: 4.1 MMOL/L — SIGNIFICANT CHANGE UP (ref 3.5–5.3)
RBC # BLD: 3.52 M/UL — LOW (ref 4.2–5.8)
RBC # BLD: 3.52 M/UL — LOW (ref 4.2–5.8)
RBC # FLD: 13.5 % — SIGNIFICANT CHANGE UP (ref 10.3–14.5)
RBC # FLD: 13.5 % — SIGNIFICANT CHANGE UP (ref 10.3–14.5)
SODIUM SERPL-SCNC: 137 MMOL/L — SIGNIFICANT CHANGE UP (ref 135–145)
SODIUM SERPL-SCNC: 137 MMOL/L — SIGNIFICANT CHANGE UP (ref 135–145)
SPECIMEN SOURCE: SIGNIFICANT CHANGE UP
SPECIMEN SOURCE: SIGNIFICANT CHANGE UP
VIT D25+D1,25 OH+D1,25 PNL SERPL-MCNC: 43.1 PG/ML — SIGNIFICANT CHANGE UP (ref 19.9–79.3)
VIT D25+D1,25 OH+D1,25 PNL SERPL-MCNC: 43.1 PG/ML — SIGNIFICANT CHANGE UP (ref 19.9–79.3)
WBC # BLD: 8.16 K/UL — SIGNIFICANT CHANGE UP (ref 3.8–10.5)
WBC # BLD: 8.16 K/UL — SIGNIFICANT CHANGE UP (ref 3.8–10.5)
WBC # FLD AUTO: 8.16 K/UL — SIGNIFICANT CHANGE UP (ref 3.8–10.5)
WBC # FLD AUTO: 8.16 K/UL — SIGNIFICANT CHANGE UP (ref 3.8–10.5)

## 2023-12-03 PROCEDURE — 99232 SBSQ HOSP IP/OBS MODERATE 35: CPT

## 2023-12-03 PROCEDURE — 71045 X-RAY EXAM CHEST 1 VIEW: CPT | Mod: 26

## 2023-12-03 RX ORDER — LOSARTAN POTASSIUM 100 MG/1
50 TABLET, FILM COATED ORAL DAILY
Refills: 0 | Status: DISCONTINUED | OUTPATIENT
Start: 2023-12-03 | End: 2023-12-06

## 2023-12-03 RX ORDER — VENLAFAXINE HCL 75 MG
150 CAPSULE, EXT RELEASE 24 HR ORAL DAILY
Refills: 0 | Status: DISCONTINUED | OUTPATIENT
Start: 2023-12-03 | End: 2023-12-06

## 2023-12-03 RX ADMIN — Medication 150 MILLIGRAM(S): at 22:32

## 2023-12-03 RX ADMIN — Medication 1000 MILLIGRAM(S): at 05:37

## 2023-12-03 RX ADMIN — POLYETHYLENE GLYCOL 3350 17 GRAM(S): 17 POWDER, FOR SOLUTION ORAL at 22:33

## 2023-12-03 RX ADMIN — Medication 1000 MILLIGRAM(S): at 22:32

## 2023-12-03 RX ADMIN — PANTOPRAZOLE SODIUM 40 MILLIGRAM(S): 20 TABLET, DELAYED RELEASE ORAL at 05:38

## 2023-12-03 RX ADMIN — SODIUM CHLORIDE 100 MILLILITER(S): 9 INJECTION, SOLUTION INTRAVENOUS at 06:04

## 2023-12-03 RX ADMIN — HYDROMORPHONE HYDROCHLORIDE 2 MILLIGRAM(S): 2 INJECTION INTRAMUSCULAR; INTRAVENOUS; SUBCUTANEOUS at 07:51

## 2023-12-03 RX ADMIN — Medication 1 MILLIGRAM(S): at 13:13

## 2023-12-03 RX ADMIN — Medication 15 MILLIGRAM(S): at 18:59

## 2023-12-03 RX ADMIN — Medication 81 MILLIGRAM(S): at 18:59

## 2023-12-03 RX ADMIN — Medication 100 MILLIGRAM(S): at 06:04

## 2023-12-03 RX ADMIN — Medication 1000 MILLIGRAM(S): at 13:53

## 2023-12-03 RX ADMIN — LOSARTAN POTASSIUM 50 MILLIGRAM(S): 100 TABLET, FILM COATED ORAL at 18:59

## 2023-12-03 RX ADMIN — Medication 1 TABLET(S): at 13:12

## 2023-12-03 RX ADMIN — SENNA PLUS 2 TABLET(S): 8.6 TABLET ORAL at 22:32

## 2023-12-03 RX ADMIN — Medication 81 MILLIGRAM(S): at 05:38

## 2023-12-03 RX ADMIN — Medication 15 MILLIGRAM(S): at 13:13

## 2023-12-03 RX ADMIN — HYDROMORPHONE HYDROCHLORIDE 2 MILLIGRAM(S): 2 INJECTION INTRAMUSCULAR; INTRAVENOUS; SUBCUTANEOUS at 07:04

## 2023-12-03 NOTE — PHYSICAL THERAPY INITIAL EVALUATION ADULT - PERTINENT HX OF CURRENT PROBLEM, REHAB EVAL
78y M pmhx depression, Spine surgery with Dr. Schwab, and ankle fracture 6 weeks ago treated nonoperatively presents with left hip pain. Pt found to have left femoral neck fx, now s/p left hip daniella-arthroplasty

## 2023-12-03 NOTE — PROGRESS NOTE ADULT - SUBJECTIVE AND OBJECTIVE BOX
Ortho Floor Note    Procedure: L hip daniella  Surgeon: Dr. Moore    Pain controlled, resting comfortably. VSS, labs wnl. NVID. Has not gotten up yet, still pending PT eval. ASA 81 bid to start today.  Denies CP, SOB, N/V, numbness/tingling     Vital Signs Last 24 Hrs  T(C): 37.3 (03 Dec 2023 05:35), Max: 37.3 (03 Dec 2023 05:35)  T(F): 99.2 (03 Dec 2023 05:35), Max: 99.2 (03 Dec 2023 05:35)  HR: 90 (03 Dec 2023 07:00) (50 - 91)  BP: 146/74 (03 Dec 2023 07:00) (116/70 - 178/90)  BP(mean): 90 (02 Dec 2023 19:30) (88 - 108)  RR: 18 (03 Dec 2023 05:35) (13 - 20)  SpO2: 97% (03 Dec 2023 05:35) (89% - 100%)    Parameters below as of 03 Dec 2023 05:35  Patient On (Oxygen Delivery Method): nasal cannula  O2 Flow (L/min): 3    General: Pt Alert and oriented, NAD    LLE  DSG C/D/I - Aquacel  Pulses: 2+ DP  Sensation: SILT  Motor: 5/5 EHL/FHL/TA/GS  Placed in cam boot    A/P: 78yMale s/p L hip daniella with Dr. Moore on 12/2.  - Stable  - Pain Control  - DVT ppx: ASA 81  - Post op abx: Ancef  - PT, WBS: WBAT  Dispo - pending PT    Ortho Pager [phone] Ortho Floor Note    Procedure: L hip daniella  Surgeon: Dr. Moore    Pain controlled, resting comfortably. VSS, labs wnl. NVID. Has not gotten up yet, still pending PT eval. ASA 81 bid to start today.  Denies CP, SOB, N/V, numbness/tingling     Vital Signs Last 24 Hrs  T(C): 37.3 (03 Dec 2023 05:35), Max: 37.3 (03 Dec 2023 05:35)  T(F): 99.2 (03 Dec 2023 05:35), Max: 99.2 (03 Dec 2023 05:35)  HR: 90 (03 Dec 2023 07:00) (50 - 91)  BP: 146/74 (03 Dec 2023 07:00) (116/70 - 178/90)  BP(mean): 90 (02 Dec 2023 19:30) (88 - 108)  RR: 18 (03 Dec 2023 05:35) (13 - 20)  SpO2: 97% (03 Dec 2023 05:35) (89% - 100%)    Parameters below as of 03 Dec 2023 05:35  Patient On (Oxygen Delivery Method): nasal cannula  O2 Flow (L/min): 3    General: Pt Alert and oriented, NAD    LLE  DSG C/D/I - Aquacel  Pulses: 2+ DP  Sensation: SILT  Motor: 5/5 EHL/FHL/TA/GS  Placed in cam boot    A/P: 78yMale s/p L hip daniella with Dr. Moore on 12/2.  - Stable  - Pain Control  - DVT ppx: ASA 81  - Post op abx: Ancef  - PT, WBS: WBAT  Dispo - pending PT    Ortho Pager [phone] Ortho Floor Note    Procedure: L hip daniella  Surgeon: Dr. Moore    Pain controlled, resting comfortably. VSS, labs wnl. NVID. Has not gotten up yet, still pending PT eval. ASA 81 bid to start today.  Denies CP, SOB, N/V, numbness/tingling     Vital Signs Last 24 Hrs  T(C): 37.3 (03 Dec 2023 05:35), Max: 37.3 (03 Dec 2023 05:35)  T(F): 99.2 (03 Dec 2023 05:35), Max: 99.2 (03 Dec 2023 05:35)  HR: 90 (03 Dec 2023 07:00) (50 - 91)  BP: 146/74 (03 Dec 2023 07:00) (116/70 - 178/90)  BP(mean): 90 (02 Dec 2023 19:30) (88 - 108)  RR: 18 (03 Dec 2023 05:35) (13 - 20)  SpO2: 97% (03 Dec 2023 05:35) (89% - 100%)    Parameters below as of 03 Dec 2023 05:35  Patient On (Oxygen Delivery Method): nasal cannula  O2 Flow (L/min): 3    General: Pt Alert and oriented, NAD    LLE  DSG C/D/I - Aquacel  Pulses: 2+ DP  Sensation: SILT  Motor: 5/5 EHL/FHL/TA/GS  Placed in cam boot    A/P: 78yMale s/p L hip daniella with Dr. Moore on 12/2.  - Stable  - Pain Control  - DVT ppx: ASA 81  - Post op abx: Ancef  - PT, WBS: WBAT  Dispo - pending PT    Ortho Pager [phone]

## 2023-12-03 NOTE — PROGRESS NOTE ADULT - ASSESSMENT
79 yo M with PMHx HTN, fibromyalgia, BPH, s/p L4 spinal fusion, multiple prior surgeries (R testicle removal, appendectomy, tonsillectomy) BIBEMS for home following fall found to have L-sided femoral neck fracture, underwent L hip hemiarthroplasty on 12/2. Medicine consulted for management.     #Post operative   - Pain control per primary   - On bowel regimen and incentive spirometry    #HTN  Patient with known hypertension, on olmesartan 20mg qD. Currently hypertensive.    - Would resume home olmesartan 20mg QD    #Anxiety   - Continue home medication (Xanax 0.5 mg PRN)    #BPH   - Needs med rec, unsure if on medication    dispo: pending PT   77 yo M with PMHx HTN, fibromyalgia, BPH, s/p L4 spinal fusion, multiple prior surgeries (R testicle removal, appendectomy, tonsillectomy) BIBEMS for home following fall found to have L-sided femoral neck fracture, underwent L hip hemiarthroplasty on 12/2. Medicine consulted for management.     #Post operative   - Pain control per primary   - On bowel regimen and incentive spirometry    #HTN  Patient with known hypertension, on olmesartan 20mg qD. Currently hypertensive.    - Would resume home olmesartan 20mg QD    #Anxiety   - Continue home medication (Xanax 0.5 mg PRN)    #BPH   - Needs med rec, unsure if on medication    dispo: pending PT

## 2023-12-03 NOTE — PHYSICAL THERAPY INITIAL EVALUATION ADULT - GENERAL OBSERVATIONS, REHAB EVAL
Pt received supine with HOB elevated in NAD, all lines in tact, call bell in reach, cleared for PT IE by RN, pt agreeable.

## 2023-12-03 NOTE — PHYSICAL THERAPY INITIAL EVALUATION ADULT - ADDITIONAL COMMENTS
Pt lives in an apartment alone +2 stairs without railing, ambulates independently, has a rolling walker but does not use often.

## 2023-12-03 NOTE — PROGRESS NOTE ADULT - SUBJECTIVE AND OBJECTIVE BOX
INTERVAL EVENTS: no acute complaints    PAST MEDICAL & SURGICAL HISTORY:  Major depressive disorder    Status post lumbar spine operation        MEDICATIONS  (STANDING):  acetaminophen     Tablet .. 1000 milliGRAM(s) Oral every 8 hours  aspirin enteric coated 81 milliGRAM(s) Oral two times a day  folic acid 1 milliGRAM(s) Oral daily  ketorolac   Injectable 15 milliGRAM(s) IV Push every 6 hours  lactated ringers. 1000 milliLiter(s) (100 mL/Hr) IV Continuous <Continuous>  multivitamin 1 Tablet(s) Oral daily  pantoprazole    Tablet 40 milliGRAM(s) Oral before breakfast  polyethylene glycol 3350 17 Gram(s) Oral at bedtime  senna 2 Tablet(s) Oral at bedtime    MEDICATIONS  (PRN):  ALPRAZolam 0.5 milliGRAM(s) Oral daily PRN anxiety  aluminum hydroxide/magnesium hydroxide/simethicone Suspension 30 milliLiter(s) Oral four times a day PRN Indigestion  HYDROmorphone   Tablet 2 milliGRAM(s) Oral every 4 hours PRN Moderate Pain (4 - 6)  HYDROmorphone   Tablet 4 milliGRAM(s) Oral every 4 hours PRN Severe Pain (7 - 10)  HYDROmorphone  Injectable 0.5 milliGRAM(s) IV Push every 15 minutes PRN PACU  HYDROmorphone  Injectable 0.5 milliGRAM(s) IV Push every 4 hours PRN Breakthrough pain  magnesium hydroxide Suspension 30 milliLiter(s) Oral daily PRN Constipation  melatonin 5 milliGRAM(s) Oral at bedtime PRN Insomnia  ondansetron Injectable 4 milliGRAM(s) IV Push every 6 hours PRN Nausea and/or Vomiting    T(F): 99.2 (12-03-23 @ 05:35), Max: 99.2 (12-03-23 @ 05:35)  HR: 78 (12-03-23 @ 11:31) (50 - 91)  BP: 143/65 (12-03-23 @ 11:31) (116/70 - 160/83)  BP(mean): 90 (12-02-23 @ 19:30) (88 - 108)  ABP: --  ABP(mean): --  RR: 18 (12-03-23 @ 11:31) (13 - 20)  SpO2: 94% (12-03-23 @ 11:31) (89% - 100%)    I/O Detail 24H    02 Dec 2023 07:01  -  03 Dec 2023 07:00  --------------------------------------------------------  IN:    IV PiggyBack: 100 mL    Lactated Ringers: 1000 mL    Lactated Ringers: 750 mL  Total IN: 1850 mL    OUT:    Indwelling Catheter - Urethral (mL): 995 mL  Total OUT: 995 mL    Total NET: 855 mL      03 Dec 2023 07:01  -  03 Dec 2023 13:46  --------------------------------------------------------  IN:    Oral Fluid: 240 mL  Total IN: 240 mL    OUT:    Indwelling Catheter - Urethral (mL): 200 mL  Total OUT: 200 mL    Total NET: 40 mL          PHYSICAL EXAM:  Constitutional: WDWN resting comfortably in bed; NAD  Eyes: anicteric sclera  ENT: MMM  Neck: supple;   Respiratory: CTA B/L; no W/R/R,  Cardiac: +S1/S2; RRR; no M/R/G;  Gastrointestinal: abdomen soft, NT/ND; no rebound or guarding; +BSx4  Extremities: WWP, no clubbing or cyanosis; no peripheral edema  Musculoskeletal: NROM x3 (RUE/RLE/LUE)  Vascular: 2+ radial, femoral, DP/PT pulses B/L  Neurologic: AAOx3    LABS:  CBC 12-03-23 @ 06:40                        10.0   8.16  )-----------( 232                   31.0       Hgb trend: 10.0 <-- , 10.8 <-- , 11.5 <--   WBC trend: 8.16 <-- , 7.67 <-- , 9.62 <--       CMP 12-03-23 @ 06:40    137  |  101  |  27<H>  ----------------------------<  113<H>  4.1   |  26  |  0.86    Ca    8.0<L>      12-03-23 @ 06:40  Phos  3.2     12-01  Mg     2.0     12-01    TPro  6.3  /  Alb  3.9  /  TBili  0.4  /  DBili  x   /  AST  28  /  ALT  31  /  AlkPhos  105  12-01      Serum Cr trend: 0.86 <-- , 0.62 <-- , 0.69 <--     PT/INR - ( 01 Dec 2023 15:36 )   PT: 10.5 sec;   INR: 0.92          PTT - ( 01 Dec 2023 15:36 ):27.9 sec    Cardiac Markers   12-01-23 @ 17:23: HS Trop T 20    / CK x     / CKMB x      12-01-23 @ 15:36: HS Trop T 18    /    / CKMB 6.7            STUDIES:

## 2023-12-03 NOTE — PHYSICAL THERAPY INITIAL EVALUATION ADULT - IMPAIRMENTS FOUND, PT EVAL
aerobic capacity/endurance/ergonomics and body mechanics/fine motor/gait, locomotion, and balance/gross motor/integumentary integrity/joint integrity and mobility/muscle strength/neuromotor development and sensory integration/poor safety awareness/posture/ROM

## 2023-12-04 ENCOUNTER — TRANSCRIPTION ENCOUNTER (OUTPATIENT)
Age: 78
End: 2023-12-04

## 2023-12-04 LAB
ANION GAP SERPL CALC-SCNC: 8 MMOL/L — SIGNIFICANT CHANGE UP (ref 5–17)
ANION GAP SERPL CALC-SCNC: 8 MMOL/L — SIGNIFICANT CHANGE UP (ref 5–17)
BUN SERPL-MCNC: 26 MG/DL — HIGH (ref 7–23)
BUN SERPL-MCNC: 26 MG/DL — HIGH (ref 7–23)
CALCIUM SERPL-MCNC: 8.1 MG/DL — LOW (ref 8.4–10.5)
CALCIUM SERPL-MCNC: 8.1 MG/DL — LOW (ref 8.4–10.5)
CHLORIDE SERPL-SCNC: 104 MMOL/L — SIGNIFICANT CHANGE UP (ref 96–108)
CHLORIDE SERPL-SCNC: 104 MMOL/L — SIGNIFICANT CHANGE UP (ref 96–108)
CO2 SERPL-SCNC: 24 MMOL/L — SIGNIFICANT CHANGE UP (ref 22–31)
CO2 SERPL-SCNC: 24 MMOL/L — SIGNIFICANT CHANGE UP (ref 22–31)
CREAT SERPL-MCNC: 0.68 MG/DL — SIGNIFICANT CHANGE UP (ref 0.5–1.3)
CREAT SERPL-MCNC: 0.68 MG/DL — SIGNIFICANT CHANGE UP (ref 0.5–1.3)
EGFR: 95 ML/MIN/1.73M2 — SIGNIFICANT CHANGE UP
EGFR: 95 ML/MIN/1.73M2 — SIGNIFICANT CHANGE UP
GLUCOSE SERPL-MCNC: 115 MG/DL — HIGH (ref 70–99)
GLUCOSE SERPL-MCNC: 115 MG/DL — HIGH (ref 70–99)
HCT VFR BLD CALC: 29.2 % — LOW (ref 39–50)
HCT VFR BLD CALC: 29.2 % — LOW (ref 39–50)
HGB BLD-MCNC: 9.4 G/DL — LOW (ref 13–17)
HGB BLD-MCNC: 9.4 G/DL — LOW (ref 13–17)
MCHC RBC-ENTMCNC: 28.6 PG — SIGNIFICANT CHANGE UP (ref 27–34)
MCHC RBC-ENTMCNC: 28.6 PG — SIGNIFICANT CHANGE UP (ref 27–34)
MCHC RBC-ENTMCNC: 32.2 GM/DL — SIGNIFICANT CHANGE UP (ref 32–36)
MCHC RBC-ENTMCNC: 32.2 GM/DL — SIGNIFICANT CHANGE UP (ref 32–36)
MCV RBC AUTO: 88.8 FL — SIGNIFICANT CHANGE UP (ref 80–100)
MCV RBC AUTO: 88.8 FL — SIGNIFICANT CHANGE UP (ref 80–100)
NRBC # BLD: 0 /100 WBCS — SIGNIFICANT CHANGE UP (ref 0–0)
NRBC # BLD: 0 /100 WBCS — SIGNIFICANT CHANGE UP (ref 0–0)
PLATELET # BLD AUTO: 213 K/UL — SIGNIFICANT CHANGE UP (ref 150–400)
PLATELET # BLD AUTO: 213 K/UL — SIGNIFICANT CHANGE UP (ref 150–400)
POTASSIUM SERPL-MCNC: 4 MMOL/L — SIGNIFICANT CHANGE UP (ref 3.5–5.3)
POTASSIUM SERPL-MCNC: 4 MMOL/L — SIGNIFICANT CHANGE UP (ref 3.5–5.3)
POTASSIUM SERPL-SCNC: 4 MMOL/L — SIGNIFICANT CHANGE UP (ref 3.5–5.3)
POTASSIUM SERPL-SCNC: 4 MMOL/L — SIGNIFICANT CHANGE UP (ref 3.5–5.3)
RBC # BLD: 3.29 M/UL — LOW (ref 4.2–5.8)
RBC # BLD: 3.29 M/UL — LOW (ref 4.2–5.8)
RBC # FLD: 13.3 % — SIGNIFICANT CHANGE UP (ref 10.3–14.5)
RBC # FLD: 13.3 % — SIGNIFICANT CHANGE UP (ref 10.3–14.5)
SODIUM SERPL-SCNC: 136 MMOL/L — SIGNIFICANT CHANGE UP (ref 135–145)
SODIUM SERPL-SCNC: 136 MMOL/L — SIGNIFICANT CHANGE UP (ref 135–145)
WBC # BLD: 8.61 K/UL — SIGNIFICANT CHANGE UP (ref 3.8–10.5)
WBC # BLD: 8.61 K/UL — SIGNIFICANT CHANGE UP (ref 3.8–10.5)
WBC # FLD AUTO: 8.61 K/UL — SIGNIFICANT CHANGE UP (ref 3.8–10.5)
WBC # FLD AUTO: 8.61 K/UL — SIGNIFICANT CHANGE UP (ref 3.8–10.5)

## 2023-12-04 PROCEDURE — 99233 SBSQ HOSP IP/OBS HIGH 50: CPT

## 2023-12-04 PROCEDURE — 71110 X-RAY EXAM RIBS BIL 3 VIEWS: CPT | Mod: 26

## 2023-12-04 RX ORDER — SELEGILINE HYDROCHLORIDE 1.25 MG/1
5 TABLET, ORALLY DISINTEGRATING ORAL
Refills: 0 | Status: DISCONTINUED | OUTPATIENT
Start: 2023-12-04 | End: 2023-12-06

## 2023-12-04 RX ORDER — TAMSULOSIN HYDROCHLORIDE 0.4 MG/1
0.4 CAPSULE ORAL AT BEDTIME
Refills: 0 | Status: DISCONTINUED | OUTPATIENT
Start: 2023-12-04 | End: 2023-12-06

## 2023-12-04 RX ORDER — SELEGILINE HYDROCHLORIDE 1.25 MG/1
1 TABLET, ORALLY DISINTEGRATING ORAL
Refills: 0 | DISCHARGE

## 2023-12-04 RX ORDER — SILODOSIN 4 MG/1
1 CAPSULE ORAL
Refills: 0 | DISCHARGE

## 2023-12-04 RX ORDER — VENLAFAXINE HCL 75 MG
1 CAPSULE, EXT RELEASE 24 HR ORAL
Refills: 0 | DISCHARGE

## 2023-12-04 RX ADMIN — HYDROMORPHONE HYDROCHLORIDE 4 MILLIGRAM(S): 2 INJECTION INTRAMUSCULAR; INTRAVENOUS; SUBCUTANEOUS at 00:15

## 2023-12-04 RX ADMIN — PANTOPRAZOLE SODIUM 40 MILLIGRAM(S): 20 TABLET, DELAYED RELEASE ORAL at 06:49

## 2023-12-04 RX ADMIN — Medication 5 MILLIGRAM(S): at 03:53

## 2023-12-04 RX ADMIN — SENNA PLUS 2 TABLET(S): 8.6 TABLET ORAL at 22:21

## 2023-12-04 RX ADMIN — HYDROMORPHONE HYDROCHLORIDE 0.5 MILLIGRAM(S): 2 INJECTION INTRAMUSCULAR; INTRAVENOUS; SUBCUTANEOUS at 02:45

## 2023-12-04 RX ADMIN — LOSARTAN POTASSIUM 50 MILLIGRAM(S): 100 TABLET, FILM COATED ORAL at 06:49

## 2023-12-04 RX ADMIN — HYDROMORPHONE HYDROCHLORIDE 4 MILLIGRAM(S): 2 INJECTION INTRAMUSCULAR; INTRAVENOUS; SUBCUTANEOUS at 00:51

## 2023-12-04 RX ADMIN — Medication 1000 MILLIGRAM(S): at 22:21

## 2023-12-04 RX ADMIN — HYDROMORPHONE HYDROCHLORIDE 0.5 MILLIGRAM(S): 2 INJECTION INTRAMUSCULAR; INTRAVENOUS; SUBCUTANEOUS at 13:22

## 2023-12-04 RX ADMIN — TAMSULOSIN HYDROCHLORIDE 0.4 MILLIGRAM(S): 0.4 CAPSULE ORAL at 22:33

## 2023-12-04 RX ADMIN — POLYETHYLENE GLYCOL 3350 17 GRAM(S): 17 POWDER, FOR SOLUTION ORAL at 22:21

## 2023-12-04 RX ADMIN — MAGNESIUM HYDROXIDE 30 MILLILITER(S): 400 TABLET, CHEWABLE ORAL at 13:32

## 2023-12-04 RX ADMIN — HYDROMORPHONE HYDROCHLORIDE 4 MILLIGRAM(S): 2 INJECTION INTRAMUSCULAR; INTRAVENOUS; SUBCUTANEOUS at 11:40

## 2023-12-04 RX ADMIN — HYDROMORPHONE HYDROCHLORIDE 4 MILLIGRAM(S): 2 INJECTION INTRAMUSCULAR; INTRAVENOUS; SUBCUTANEOUS at 12:40

## 2023-12-04 RX ADMIN — Medication 1 TABLET(S): at 11:40

## 2023-12-04 RX ADMIN — Medication 81 MILLIGRAM(S): at 06:49

## 2023-12-04 RX ADMIN — Medication 1000 MILLIGRAM(S): at 06:49

## 2023-12-04 RX ADMIN — Medication 0.5 MILLIGRAM(S): at 03:53

## 2023-12-04 RX ADMIN — Medication 81 MILLIGRAM(S): at 17:09

## 2023-12-04 RX ADMIN — HYDROMORPHONE HYDROCHLORIDE 0.5 MILLIGRAM(S): 2 INJECTION INTRAMUSCULAR; INTRAVENOUS; SUBCUTANEOUS at 02:27

## 2023-12-04 RX ADMIN — Medication 1000 MILLIGRAM(S): at 13:32

## 2023-12-04 RX ADMIN — HYDROMORPHONE HYDROCHLORIDE 0.5 MILLIGRAM(S): 2 INJECTION INTRAMUSCULAR; INTRAVENOUS; SUBCUTANEOUS at 13:07

## 2023-12-04 RX ADMIN — Medication 150 MILLIGRAM(S): at 13:31

## 2023-12-04 RX ADMIN — Medication 1 MILLIGRAM(S): at 11:40

## 2023-12-04 NOTE — PROGRESS NOTE ADULT - SUBJECTIVE AND OBJECTIVE BOX
Ortho Note    subjective: PT session limited yesterday due to anxiety.  Uncomfortable overnight and in pain requiring a dose of dilaudid. Home omesartan restarted for high BPs.    Vital Signs Last 24 Hrs  T(C): 37.1 (12-04-23 @ 03:53), Max: 37.1 (12-04-23 @ 03:53)  T(F): 98.7 (12-04-23 @ 03:53), Max: 98.7 (12-04-23 @ 03:53)  HR: 78 (12-04-23 @ 03:53) (68 - 78)  BP: 168/82 (12-04-23 @ 03:53) (168/82 - 184/89)  BP(mean): --  RR: 18 (12-04-23 @ 03:53) (18 - 18)  SpO2: 95% (12-04-23 @ 03:53) (95% - 95%)  I&O's Summary    02 Dec 2023 07:01  -  03 Dec 2023 07:00  --------------------------------------------------------  IN: 1850 mL / OUT: 995 mL / NET: 855 mL    03 Dec 2023 07:01  -  04 Dec 2023 06:53  --------------------------------------------------------  IN: 1680 mL / OUT: 950 mL / NET: 730 mL        Physical Exam:  LLE  DSG C/D/I - Aquacel  Pulses: 2+ DP  Sensation: SILT  Motor: 5/5 EHL/FHL/TA/GS  Placed in cam boot                          9.4    8.61  )-----------( 213      ( 04 Dec 2023 06:09 )             29.2     12-04    136  |  104  |  26<H>  ----------------------------<  115<H>  4.0   |  24  |  0.68    Ca    8.1<L>      04 Dec 2023 06:09        A/P: 78yMale s/p L hip daniella with Dr. Moore on 12/2.  - Stable  - Pain Control  - DVT ppx: ASA 81  - Post op abx: Ancef  - PT, WBS: WBAT posterior hip precautions, WBAT R ankle with CAM boot  Dispo - KELSIE Luna, PGY-3  Ortho Pager [phone] Ortho Note    subjective: PT session limited yesterday due to anxiety.  Uncomfortable overnight and in pain requiring a dose of dilaudid. Home omesartan restarted for high BPs.    Vital Signs Last 24 Hrs  T(C): 37.1 (12-04-23 @ 03:53), Max: 37.1 (12-04-23 @ 03:53)  T(F): 98.7 (12-04-23 @ 03:53), Max: 98.7 (12-04-23 @ 03:53)  HR: 78 (12-04-23 @ 03:53) (68 - 78)  BP: 168/82 (12-04-23 @ 03:53) (168/82 - 184/89)  BP(mean): --  RR: 18 (12-04-23 @ 03:53) (18 - 18)  SpO2: 95% (12-04-23 @ 03:53) (95% - 95%)  I&O's Summary    02 Dec 2023 07:01  -  03 Dec 2023 07:00  --------------------------------------------------------  IN: 1850 mL / OUT: 995 mL / NET: 855 mL    03 Dec 2023 07:01  -  04 Dec 2023 06:53  --------------------------------------------------------  IN: 1680 mL / OUT: 950 mL / NET: 730 mL        Physical Exam:  LLE  DSG C/D/I - Aquacel  Pulses: 2+ DP  Sensation: SILT  Motor: 5/5 EHL/FHL/TA/GS  Placed in cam boot                          9.4    8.61  )-----------( 213      ( 04 Dec 2023 06:09 )             29.2     12-04    136  |  104  |  26<H>  ----------------------------<  115<H>  4.0   |  24  |  0.68    Ca    8.1<L>      04 Dec 2023 06:09        A/P: 78yMale s/p L hip daniella with Dr. Moore on 12/2.  - Stable  - Pain Control  - DVT ppx: ASA 81  - Post op abx: Ancef  - PT, WBS: WBAT posterior hip precautions, WBAT R ankle with CAM boot  Dispo - KELSIE Luna, PGY-3  Ortho Pager [phone] Ortho Note    subjective: PT session limited yesterday due to anxiety.  Uncomfortable overnight and in pain requiring a dose of dilaudid. Home omesartan restarted for high BPs.    Vital Signs Last 24 Hrs  T(C): 37.1 (12-04-23 @ 03:53), Max: 37.1 (12-04-23 @ 03:53)  T(F): 98.7 (12-04-23 @ 03:53), Max: 98.7 (12-04-23 @ 03:53)  HR: 78 (12-04-23 @ 03:53) (68 - 78)  BP: 168/82 (12-04-23 @ 03:53) (168/82 - 184/89)  BP(mean): --  RR: 18 (12-04-23 @ 03:53) (18 - 18)  SpO2: 95% (12-04-23 @ 03:53) (95% - 95%)  I&O's Summary    02 Dec 2023 07:01  -  03 Dec 2023 07:00  --------------------------------------------------------  IN: 1850 mL / OUT: 995 mL / NET: 855 mL    03 Dec 2023 07:01  -  04 Dec 2023 06:53  --------------------------------------------------------  IN: 1680 mL / OUT: 950 mL / NET: 730 mL        Physical Exam:  LLE  DSG C/D/I - Aquacel  Pulses: 2+ DP  Sensation: SILT  Motor: 5/5 EHL/FHL/TA/GS  Placed in cam boot                          9.4    8.61  )-----------( 213      ( 04 Dec 2023 06:09 )             29.2     12-04    136  |  104  |  26<H>  ----------------------------<  115<H>  4.0   |  24  |  0.68    Ca    8.1<L>      04 Dec 2023 06:09        A/P: 78yMale s/p L hip daniella with Dr. Moore on 12/2.  - Stable  - Pain Control  - DVT ppx: ASA 81  - Post op abx: Ancef  - PT, WBS: WBAT LLE , WBAT L ankle with CAM boot  Dispo - KELSIE Luna, PGY-3  Ortho Pager [phone] Ortho Note    subjective: PT session limited yesterday due to anxiety.  Uncomfortable overnight and in pain requiring a dose of dilaudid. Home omesartan restarted for high BPs.    Vital Signs Last 24 Hrs  T(C): 37.1 (12-04-23 @ 03:53), Max: 37.1 (12-04-23 @ 03:53)  T(F): 98.7 (12-04-23 @ 03:53), Max: 98.7 (12-04-23 @ 03:53)  HR: 78 (12-04-23 @ 03:53) (68 - 78)  BP: 168/82 (12-04-23 @ 03:53) (168/82 - 184/89)  BP(mean): --  RR: 18 (12-04-23 @ 03:53) (18 - 18)  SpO2: 95% (12-04-23 @ 03:53) (95% - 95%)  I&O's Summary    02 Dec 2023 07:01  -  03 Dec 2023 07:00  --------------------------------------------------------  IN: 1850 mL / OUT: 995 mL / NET: 855 mL    03 Dec 2023 07:01  -  04 Dec 2023 06:53  --------------------------------------------------------  IN: 1680 mL / OUT: 950 mL / NET: 730 mL        Physical Exam:  LLE  DSG C/D/I - Aquacel  Pulses: 2+ DP  Sensation: SILT  Motor: 5/5 EHL/FHL/TA/GS  Placed in cam boot                          9.4    8.61  )-----------( 213      ( 04 Dec 2023 06:09 )             29.2     12-04    136  |  104  |  26<H>  ----------------------------<  115<H>  4.0   |  24  |  0.68    Ca    8.1<L>      04 Dec 2023 06:09        A/P: 78yMale s/p L hip daniella with Dr. Moore on 12/2.  - Stable  - Pain Control  - DVT ppx: ASA 81  - Post op abx: Ancef  - PT, WBS: WBAT LLE , WBAT L ankle with CAM boot  Dispo - KELSIE Luna, PGY-3  Ortho Pager [phone]

## 2023-12-04 NOTE — OCCUPATIONAL THERAPY INITIAL EVALUATION ADULT - MD ORDER
Mechanical Fall w/ Left Hip Pain   Left Hip Fracture  S/P Left Hip Hemiarthroplasty on 12/02/23 (Anterior Approach)

## 2023-12-04 NOTE — OCCUPATIONAL THERAPY INITIAL EVALUATION ADULT - GENERAL OBSERVATIONS, REHAB EVAL
Pt's RN Ivanna aware of intent to eval/tx; cleared Pt. Pt received in supine - +telemetry, crow, 02 NC 2L, L hip dressing, L cam boot. Pt agreeable to OT.

## 2023-12-04 NOTE — DISCHARGE NOTE PROVIDER - HOSPITAL COURSE
Admitted: 12/1/23 via ED  Surgery: 12/2/23, left hip hemiarthroplasty  Lorri-op Antibiotics: Ancef  Pain control  DVT prophylaxis: SCDs, ASA 81  OOB/Physical Therapy  Consultants: Medicine  Inpatient events:   Admitted: 12/1/23 via ED  Surgery: 12/2/23, left hip hemiarthroplasty  Lorri-op Antibiotics: Ancef  Pain control  DVT prophylaxis: SCDs, ASA 81  OOB/Physical Therapy  Consultants: Medicine  Inpatient events:  12/1/23 - admit s/p fall with left femoral neck fracture   12/2/23 - OR for left hip daniella-arthroplasty   12/4/23 - agitated overnight, zyprexa given, agitation resolved  12/5/23     Admitted: 12/1/23 via ED  Surgery: 12/2/23, left hip hemiarthroplasty  Lorri-op Antibiotics: Ancef  Pain control  DVT prophylaxis: SCDs, ASA 81  OOB/Physical Therapy  Consultants: Medicine  Inpatient events:  12/1/23 - admit s/p fall with left femoral neck fracture   12/2/23 - OR for left hip daniella-arthroplasty   12/4/23 - agitated overnight, zyprexa given, agitation resolved; urinalysis sent for dysuria  12/5/23 - urinalysis resulted, negative for bacteria, symptoms resolving. Blood pressure medication dose increased for hypertension      Admitted: 12/1/23 via ED  Surgery: 12/2/23, left hip hemiarthroplasty  Lorri-op Antibiotics: Ancef  Pain control  DVT prophylaxis: SCDs, ASA 81  OOB/Physical Therapy  Consultants: Medicine  Inpatient events:  12/1/23 - admitted to [facility] s/p fall with left femoral neck fracture   12/2/23 - OR for left hip daniella-arthroplasty   12/3/23 - CXR performed for rib pain  < from: Xray Chest 1 View AP/PA (12.03.23 @ 21:09) >  IMPRESSION:  Few bilateral rib fracture deformities are again seen, however   incompletely visualized on this or prior exam. If clinical concern for   acute rib fracture, a formal, possibly the left fifth, seventh and eighth   ribs. Rib series is recommended.  Remainder of exam is similar to prior.  The results of this examination were verbally communicated with read back   to the physician assistant Rebecca Goldberg, on 12/4/2023 at 11:19 AM.  < end of copied text >  12/4/23 - agitated overnight, zyprexa given, agitation resolved; urinalysis sent for dysuria  Given history of falls and possible syncope as a cause (currently being worked up outpatient by Dr. Del Castillo), echo performed, TTE shows normal LVE, slightly elevated PASP  12/5/23 - urinalysis resulted, negative for bacteria, symptoms resolving. Blood pressure medication dose increased for hypertension   12/6/23 - CAM boot switched to air cast. Medically optimized for discharge to [facility]     Admitted: 12/1/23 via ED  Surgery: 12/2/23, left hip hemiarthroplasty  Lorri-op Antibiotics: Ancef  Pain control  DVT prophylaxis: SCDs, ASA 81  OOB/Physical Therapy  Consultants: Medicine  Inpatient events:  12/1/23 - admitted to [facility] s/p fall with left femoral neck fracture   12/2/23 - OR for left hip daniella-arthroplasty   12/3/23 - CXR performed for rib pain  < from: Xray Chest 1 View AP/PA (12.03.23 @ 21:09) >  IMPRESSION:  Few bilateral rib fracture deformities are again seen, however   incompletely visualized on this or prior exam. If clinical concern for   acute rib fracture, a formal, possibly the left fifth, seventh and eighth   ribs. Rib series is recommended.  Remainder of exam is similar to prior.  The results of this examination were verbally communicated with read back   to the physician assistant Rebecca Goldberg, on 12/4/2023 at 11:19 AM.  < end of copied text >  12/4/23 - agitated overnight, zyprexa given, agitation resolved; urinalysis sent for dysuria  Given history of falls and possible syncope as a cause (currently being worked up outpatient by Dr. Del Castillo), echo performed, TTE shows normal LVE, slightly elevated PASP  12/5/23 - urinalysis resulted, negative for bacteria, symptoms resolving. Blood pressure medication dose increased for hypertension   12/6/23 - CAM boot switched to air cast. Medically optimized for discharge to [facility]

## 2023-12-04 NOTE — DISCHARGE NOTE PROVIDER - CARE PROVIDER_API CALL
Mitch Moore  Orthopaedic Surgery  [address] [place] [address], [address]  [place] [address]  Phone: [phone]  Fax: [phone]  Follow Up Time: 2 weeks   Mitch Moore  Orthopaedic Surgery  [address] [place] [address], [address]  [place] [address]  Phone: [phone]  Fax: [phone]  Follow Up Time: 2 weeks   Mitch Moore  Orthopaedic Surgery  [address] [facility], [address]  [place] [address]  Phone: [phone]  Fax: [phone]  Follow Up Time: 2 weeks    Kvng Ang  Orthopaedic Surgery  [address] [place] [address], [address]  [place] [address]  Phone: [phone]  Fax: [phone]  Follow Up Time:    Mitch Moore  Orthopaedic Surgery  [address] [facility], [address]  [place] [address]  Phone: [phone]  Fax: [phone]  Follow Up Time: 2 weeks    Kvng Ang  Orthopaedic Surgery  [address] [place] [address], [address]  [place] [address]  Phone: [phone]  Fax: [phone]  Follow Up Time:

## 2023-12-04 NOTE — DISCHARGE NOTE PROVIDER - NSDCCPCAREPLAN_GEN_ALL_CORE_FT
PRINCIPAL DISCHARGE DIAGNOSIS  Diagnosis: Hip fracture  Assessment and Plan of Treatment:       SECONDARY DISCHARGE DIAGNOSES  Diagnosis: Syncope  Assessment and Plan of Treatment:

## 2023-12-04 NOTE — DISCHARGE NOTE PROVIDER - REASON FOR ADMISSION
Mom would like to speak with Dr Jonathan Reyes about the pt's renetta test results. Please advise. Left hip pain

## 2023-12-04 NOTE — DISCHARGE NOTE PROVIDER - PROVIDER TOKENS
PROVIDER:[TOKEN:[[id number]],FOLLOWUP:[2 weeks]] PROVIDER:[TOKEN:[[id number]],FOLLOWUP:[2 weeks]],PROVIDER:[TOKEN:[[id number]]] PROVIDER:[TOKEN:[[id number]],FOLLOWUP:[2 weeks]],PROVIDER:[TOKEN:[[id number]]]

## 2023-12-04 NOTE — OCCUPATIONAL THERAPY INITIAL EVALUATION ADULT - PLANNED THERAPY INTERVENTIONS, OT EVAL
ADL retraining/IADL retraining/balance training/bed mobility training/fine motor coordination training/motor coordination training/neuromuscular re-education/orthotic fitting/training/parent/caregiver training.../ROM/strengthening/transfer training

## 2023-12-04 NOTE — DISCHARGE NOTE PROVIDER - NSDCFUADDINST_GEN_ALL_CORE_FT
ACTIVITY:  - Weightbear as tolerated with assistive device. No strenuous activity, heavy lifting, driving or returning to work until cleared by MD.  - You may experience postoperative swelling on the operative extremity. You may ice the surgery site for 20 minute intervals every hour.  __________________________  DRESSING: Aquacel  - Aquacel (brown waxy dressing): You have an Aquacel dressing. This dressing is water resistant. You may shower with this dressing. Do not submerge this dressing in sitting water (ie tub). If the padding in this dressing should get wet, gently peel off and leave incision open to air.  - You may shower, your dressing is water-resistant. Do not soak in bathtubs. Remove dressing after postop day 7-10, then leave incision open to air.  - Keep your incision clean and dry. Do not pick at your incision. Do not apply creams, ointments or oils to your incision until cleared by your surgeon. Do not soak your incision in sitting water (ie tubs, pools, lakes, etc.) until cleared by your surgeon. You may let clean, running water fall over your incision.  - For any questions or concerns regarding incision or dressing care, call your surgeon's office.  __________________________  MEDICATION/ANTICOAGULATION:  - You have been prescribed Aspitin 81mg BID, as a preventative to help prevent postoperative blood clots. Please take this medication as prescribed.   - You have been prescribed medications for pain:    - Tylenol for mild to moderate pain. Do not exceed 3,000mg daily.    - Narcotic pain medication. For more severe pain, take Tylenol with the addition of narcotic pain medication. Take this medication as prescribed. This medication may cause drowsiness or dizziness. Do not operate machinery. This medication may cause constipation.  - Try to have regular bowel movements. Take stool softener or laxative if necessary. You may wish to take Miralax (purchased over the counter) daily until you have regular bowel movements.   - GI protection. Please take Pantoprazole once a day, before breakfast, until no longer taking Aspirin, blood thinner, or anti-inflammatory. This will help protect your stomach.  - For any additional medications, follow instructions on the bottle.   - If you have a pain management physician, please follow-up with them postoperatively.   - If you experience any negative side effects of your medications, please call your surgeon's office to discuss.  __________________________  Follow-up:  - Call to schedule an appt with Dr. Moore within 2 weeks for follow up. If you have staples or sutures they will be removed in office.  - Please follow-up with your primary care physician or any other specialist you see postoperatively, if needed.   - Contact your doctor if you experience: fever greater than 101.5, chills, chest pain, difficulty breathing, redness or excessive drainage around the incision, other concerns.   ACTIVITY:  - Weightbear as tolerated with assistive device. No strenuous activity, heavy lifting, driving or returning to work until cleared by MD.  - You may experience postoperative swelling on the operative extremity. You may ice the surgery site for 20 minute intervals every hour.    You have an air-cast to your left lower extremity for an old left ankle fracture. Wear this until you follow up with Dr. Eddy.   __________________________  DRESSING: Aquacel  - Aquacel (brown waxy dressing): You have an Aquacel dressing. This dressing is water resistant. You may shower with this dressing. Do not submerge this dressing in sitting water (ie tub). If the padding in this dressing should get wet, gently peel off and leave incision open to air.  - You may shower, your dressing is water-resistant. Do not soak in bathtubs. Remove dressing after postop day 10, then leave incision open to air.  - Keep your incision clean and dry. Do not pick at your incision. Do not apply creams, ointments or oils to your incision until cleared by your surgeon. Do not soak your incision in sitting water (ie tubs, pools, lakes, etc.) until cleared by your surgeon. You may let clean, running water fall over your incision.  - For any questions or concerns regarding incision or dressing care, call your surgeon's office.  __________________________  MEDICATION/ANTICOAGULATION:  - You have been prescribed Aspitin 81mg BID, as a preventative to help prevent postoperative blood clots. Please take this medication as prescribed.   - You have been prescribed medications for pain:    - Tylenol for mild to moderate pain. Do not exceed 3,000mg daily.    - Narcotic pain medication. For more severe pain, take Tylenol with the addition of narcotic pain medication. Take this medication as prescribed. This medication may cause drowsiness or dizziness. Do not operate machinery. This medication may cause constipation.  - Try to have regular bowel movements. Take stool softener or laxative if necessary. You may wish to take Miralax (purchased over the counter) daily until you have regular bowel movements.   - GI protection. Please take Pantoprazole once a day, before breakfast, until no longer taking Aspirin, blood thinner, or anti-inflammatory. This will help protect your stomach.  - For any additional medications, follow instructions on the bottle.   - If you have a pain management physician, please follow-up with them postoperatively.   - If you experience any negative side effects of your medications, please call your surgeon's office to discuss.  __________________________  Follow-up:  - Call to schedule an appt with Dr. Moore within 2 weeks for follow up. If you have staples or sutures they will be removed in office.  - Please follow-up with your primary care physician or any other specialist you see postoperatively, if needed.   - Contact your doctor if you experience: fever greater than 101.5, chills, chest pain, difficulty breathing, redness or excessive drainage around the incision, other concerns.    Your blood pressure was slightly elevated during your admission; your blood pressure medication dose was increased. Take as directed and follow up with your Primary care provider.    ACTIVITY:  - Weight-bear as tolerated with assistive device. No strenuous activity, heavy lifting, driving or returning to work until cleared by MD.  - You may experience postoperative swelling on the operative extremity. You may ice the surgery site for 20 minute intervals every hour.    You have an air-cast to your left lower extremity for an old left ankle fracture. Wear this until you follow up with Dr. Eddy.   __________________________  DRESSING: Aquacel  - Aquacel (brown waxy dressing): You have an Aquacel dressing. This dressing is water resistant. You may shower with this dressing. Do not submerge this dressing in sitting water (ie tub). If the padding in this dressing should get wet, gently peel off and leave incision open to air.  - You may shower, your dressing is water-resistant. Do not soak in bathtubs. Remove dressing after postop day 10, then leave incision open to air.  - Keep your incision clean and dry. Do not pick at your incision. Do not apply creams, ointments or oils to your incision until cleared by your surgeon. Do not soak your incision in sitting water (i.e., tubs, pools, lakes, etc.) until cleared by your surgeon. You may let clean, running water fall over your incision.  - For any questions or concerns regarding incision or dressing care, call your surgeon's office.  __________________________  MEDICATION/ANTICOAGULATION:  - You have been prescribed Aspirin 81mg BID for 30 days after surgery, as a preventative to help prevent postoperative blood clots. Please take this medication as prescribed.   - You have been prescribed medications for pain:    - Tylenol for mild to moderate pain. Do not exceed 3,000mg daily.    - Narcotic pain medication. For more severe pain, take Tylenol with the addition of narcotic pain medication. Take this medication as prescribed. This medication may cause drowsiness or dizziness. Do not operate machinery. This medication may cause constipation.  - Try to have regular bowel movements. Take stool softener or laxative if necessary. You may wish to take Miralax (purchased over the counter) daily until you have regular bowel movements.   - GI protection. Please take Pantoprazole once a day, before breakfast, until no longer taking Aspirin, blood thinner, or anti-inflammatory. This will help protect your stomach.  - For any additional medications, follow instructions on the bottle.   - If you have a pain management physician, please follow-up with them postoperatively.   - If you experience any negative side effects of your medications, please call your surgeon's office to discuss.  __________________________  Follow-up:  - Call to schedule an appt with Dr. Moore within 2 weeks for follow up. If you have staples or sutures they will be removed in office.  - Please follow-up with your primary care physician or any other specialist you see postoperatively, if needed.   - Contact your doctor if you experience: fever greater than 101.5, chills, chest pain, difficulty breathing, redness or excessive drainage around the incision, other concerns.    -Follow up with your PCP for osteoporosis workup - recommend outpatient DEXA scan and possible need to start on bisphosphonates.   -Your blood pressure was slightly elevated during your admission; your blood pressure medication dose was increased. Recommend increasing dose of olmesartan to 30mg daily upon discharge. Take as directed and follow up with your Primary care provider.  ***You have had multiple falls recently. You are being worked up for this by your PCP Dr. Byrnes. PLEASE FOLLOW UP CLOSELY WITH DR. BYRNES UPON DISCHARGE FOR FURTHER FOLLOW UP OF THE CAUSE OF THESE FALLS***   Chest xray done showed rib fractures questionable if acute vs. subacute. Your oxygen saturation and breathing has been unaffected. Recommend supportive care and follow up with PCP.

## 2023-12-04 NOTE — PROGRESS NOTE ADULT - ASSESSMENT
78M w HTN, BPH, spinal surgery - L4 spinal fusion, depression, history of 3 falls c/f syncopal episodes is last several months, p/w syncopal episode found to have L femoral neck fracture s/p L hip hemiarthroplasty 12/2 w Dr. Moore    #Syncopal episode - pt denies prodromal symptoms but felt he fell forward after getting up out of bed. Reports outpatient work-up with Dr. Yung/Brendan Ansari at [place] w EKG/TTE being unremarkable. Pt recalls being told possible contributing factor was doxazosin - suspect orthostatic hypotension but this has been the only medication that has been effective for his BPH    #Post-op state. Pain controlled. PPx: ASA 81 BID. On bowel regimen and incentive spirometer  #L femoral fx   #Osteoporosis - TSH, PTH nml   -- discussed osteoporosis dx w patient. Recommend outpatient DEXA scan and to start bisphosphonates    #Blood loss anemia - 9.4 today. Was 11.5 on admission. Appears asymptomatic  #HTN - c/w home losartan  #Fibromylagia - no longer on lyrica per patient  #Depression - home on selegiline 5mg AM and venlafaxine 150mg daily    Plan  Obtain TTE to evaluate syncope - as unable to contact PCP office and obtain TTE  f/u orthostatics while working w PT  Restart doxazosin, TOV  Restart home selegiline, venlafaxine for depression  25,OH-D.     DISPO: KELSIE 78M w HTN, BPH, spinal surgery - L4 spinal fusion, depression, history of 3 falls c/f syncopal episodes is last several months, p/w syncopal episode found to have L femoral neck fracture s/p L hip hemiarthroplasty 12/2 w Dr. Moore    #Syncopal episode - pt denies prodromal symptoms but felt he fell forward after getting up out of bed. Reports outpatient work-up with Dr. Yung/Brendan Ansari at [place] w EKG/TTE being unremarkable. Pt recalls being told possible contributing factor was doxazosin - suspect orthostatic hypotension but this has been the only medication that has been effective for his BPH    #Post-op state. Pain controlled. PPx: ASA 81 BID. On bowel regimen and incentive spirometer  #L femoral fx   #Osteoporosis - TSH, PTH nml   -- discussed osteoporosis dx w patient. Recommend outpatient DEXA scan and to start bisphosphonates    #Blood loss anemia - 9.4 today. Was 11.5 on admission. Appears asymptomatic  #HTN - c/w home losartan  #Fibromylagia - no longer on lyrica per patient  #Depression - home on selegiline 5mg AM and venlafaxine 150mg daily    Plan  Obtain TTE to evaluate syncope - as unable to contact PCP office and obtain TTE  f/u orthostatics while working w PT  Restart doxazosin, TOV  Restart home selegiline, venlafaxine for depression  25,OH-D.     DISPO: KELSIE

## 2023-12-04 NOTE — OCCUPATIONAL THERAPY INITIAL EVALUATION ADULT - PERSONAL SAFETY AND JUDGMENT, REHAB EVAL
Rx as faxed to [facility] in  on [address] on 9/26/2017.  Jayna Rider       Requires cues to increase safety awareness w/ functional activities

## 2023-12-04 NOTE — DISCHARGE NOTE PROVIDER - NSDCMRMEDTOKEN_GEN_ALL_CORE_FT
selegiline 5 mg oral tablet: 1 tab(s) orally once a day  silodosin 8 mg oral capsule: 1 cap(s) orally once a day (at bedtime)  venlafaxine 150 mg oral tablet, extended release: 1 tab(s) orally once a day   acetaminophen 500 mg oral tablet: 2 tab(s) orally every 8 hours  ALPRAZolam 0.5 mg oral tablet: 1 tab(s) orally once a day As needed anxiety  aspirin 81 mg oral delayed release tablet: 1 tab(s) orally 2 times a day for 30 days after surgery  HYDROmorphone 2 mg oral tablet: 1 tab(s) orally every 4 hours As needed Moderate Pain (4 - 6)  HYDROmorphone 4 mg oral tablet: 1 tab(s) orally every 4 hours As needed Severe Pain (7 - 10)  olmesartan 20 mg oral tablet: 1.5 tab(s) orally once a day Per medicine recommendation, we recommend increasing your home dose of olmesartan to 30mg on discharge; follow up with your primary care provider  pantoprazole 40 mg oral delayed release tablet: 1 tab(s) orally once a day (before a meal)  polyethylene glycol 3350 oral powder for reconstitution: 17 gram(s) orally once a day (at bedtime) until bowel movement then as needed for constipation  selegiline 5 mg oral tablet: 1 tab(s) orally once a day  senna leaf extract oral tablet: 2 tab(s) orally once a day (at bedtime) until bowel movement then as needed for constipation  silodosin 8 mg oral capsule: 1 cap(s) orally once a day (at bedtime)  venlafaxine 150 mg oral tablet, extended release: 1 tab(s) orally once a day

## 2023-12-04 NOTE — PROGRESS NOTE ADULT - SUBJECTIVE AND OBJECTIVE BOX
INTERVAL HPI/OVERNIGHT EVENTS: tom o/n    SUBJECTIVE: Patient seen and examined at bedside.   Pt sitting up on side of bed. states pain is controlled in R hip w movement. No numbness. Denies any LH/dizziness, chest pain, dyspnea. Eating wo N/V/abd pain. +flatus wo BM. Voiding wo dysuria. Denies fevers    Reports this is 3rd fall in last several months. Does not remember prodrome of fall except having gotten out of bed, then falling forward. Denies loss of bowel/bladder control, prodromal sxs (LH/dizziness, chest pain, palpitations, nausea, sweats). Reports he was sent for EKG and TTE after 1st fall which returned negative - PCP is Dr. jhonny Ansari at [place]    OBJECTIVE:    VITAL SIGNS:  ICU Vital Signs Last 24 Hrs  T(C): 36.7 (04 Dec 2023 08:43), Max: 37.2 (03 Dec 2023 20:37)  T(F): 98 (04 Dec 2023 08:43), Max: 98.9 (03 Dec 2023 20:37)  HR: 82 (04 Dec 2023 13:22) (68 - 84)  BP: 161/83 (04 Dec 2023 13:22) (145/70 - 184/89)  BP(mean): --  ABP: --  ABP(mean): --  RR: 18 (04 Dec 2023 12:24) (16 - 18)  SpO2: 96% (04 Dec 2023 13:22) (90% - 96%)    O2 Parameters below as of 04 Dec 2023 13:22  Patient On (Oxygen Delivery Method): nasal cannula  O2 Flow (L/min): 2            12-03 @ 07:01  -  12-04 @ 07:00  --------------------------------------------------------  IN: 1680 mL / OUT: 1500 mL / NET: 180 mL    12-04 @ 07:01  -  12-04 @ 13:56  --------------------------------------------------------  IN: 380 mL / OUT: 350 mL / NET: 30 mL      CAPILLARY BLOOD GLUCOSE          PHYSICAL EXAM:  GEN: Male in NAD on RA  HEENT: NC/AT, MMM  CV: RRR, nml S1S2, no murmurs  PULM: nml effort, CTAB  ABD: Soft, non-distended, NABS, non-tender  NEURO  A/O x3, moving all extremities, R hip dressing c/d/i. 5/5 in plantarflex/ext. Sensation intact  PSYCH: Appropriate      MEDICATIONS:  MEDICATIONS  (STANDING):  acetaminophen     Tablet .. 1000 milliGRAM(s) Oral every 8 hours  aspirin enteric coated 81 milliGRAM(s) Oral two times a day  folic acid 1 milliGRAM(s) Oral daily  lactated ringers. 1000 milliLiter(s) (100 mL/Hr) IV Continuous <Continuous>  losartan 50 milliGRAM(s) Oral daily  multivitamin 1 Tablet(s) Oral daily  pantoprazole    Tablet 40 milliGRAM(s) Oral before breakfast  polyethylene glycol 3350 17 Gram(s) Oral at bedtime  selegiline Oral Tab/Cap 5 milliGRAM(s) Oral <User Schedule>  senna 2 Tablet(s) Oral at bedtime  tamsulosin 0.4 milliGRAM(s) Oral at bedtime  venlafaxine XR. 150 milliGRAM(s) Oral daily    MEDICATIONS  (PRN):  ALPRAZolam 0.5 milliGRAM(s) Oral daily PRN anxiety  aluminum hydroxide/magnesium hydroxide/simethicone Suspension 30 milliLiter(s) Oral four times a day PRN Indigestion  bisacodyl Suppository 10 milliGRAM(s) Rectal once PRN Constipation  HYDROmorphone   Tablet 2 milliGRAM(s) Oral every 4 hours PRN Moderate Pain (4 - 6)  HYDROmorphone   Tablet 4 milliGRAM(s) Oral every 4 hours PRN Severe Pain (7 - 10)  HYDROmorphone  Injectable 0.5 milliGRAM(s) IV Push every 15 minutes PRN PACU  HYDROmorphone  Injectable 0.5 milliGRAM(s) IV Push every 4 hours PRN Breakthrough pain  magnesium hydroxide Suspension 30 milliLiter(s) Oral daily PRN Constipation  melatonin 5 milliGRAM(s) Oral at bedtime PRN Insomnia  ondansetron Injectable 4 milliGRAM(s) IV Push every 6 hours PRN Nausea and/or Vomiting      ALLERGIES:  Allergies    No Known Allergies    Intolerances        LABS:                        9.4    8.61  )-----------( 213      ( 04 Dec 2023 06:09 )             29.2     12-04    136  |  104  |  26<H>  ----------------------------<  115<H>  4.0   |  24  |  0.68    Ca    8.1<L>      04 Dec 2023 06:09        Urinalysis Basic - ( 04 Dec 2023 06:09 )    Color: x / Appearance: x / SG: x / pH: x  Gluc: 115 mg/dL / Ketone: x  / Bili: x / Urobili: x   Blood: x / Protein: x / Nitrite: x   Leuk Esterase: x / RBC: x / WBC x   Sq Epi: x / Non Sq Epi: x / Bacteria: x        RADIOLOGY & ADDITIONAL TESTS: Reviewed. INTERVAL HPI/OVERNIGHT EVENTS: tom o/n    SUBJECTIVE: Patient seen and examined at bedside.   Pt sitting up on side of bed. states pain is controlled in R hip w movement. No numbness. Denies any LH/dizziness, chest pain, dyspnea. Eating wo N/V/abd pain. +flatus wo BM. Voiding wo dysuria. Denies fevers    Reports this is 3rd fall in last several months. Does not remember prodrome of fall except having gotten out of bed, then falling forward. Denies loss of bowel/bladder control, prodromal sxs (LH/dizziness, chest pain, palpitations, nausea, sweats). Reports he was sent for EKG and TTE after 1st fall which returned negative - PCP is Dr. jhonny Anasri at [place]    OBJECTIVE:    VITAL SIGNS:  ICU Vital Signs Last 24 Hrs  T(C): 36.7 (04 Dec 2023 08:43), Max: 37.2 (03 Dec 2023 20:37)  T(F): 98 (04 Dec 2023 08:43), Max: 98.9 (03 Dec 2023 20:37)  HR: 82 (04 Dec 2023 13:22) (68 - 84)  BP: 161/83 (04 Dec 2023 13:22) (145/70 - 184/89)  BP(mean): --  ABP: --  ABP(mean): --  RR: 18 (04 Dec 2023 12:24) (16 - 18)  SpO2: 96% (04 Dec 2023 13:22) (90% - 96%)    O2 Parameters below as of 04 Dec 2023 13:22  Patient On (Oxygen Delivery Method): nasal cannula  O2 Flow (L/min): 2            12-03 @ 07:01  -  12-04 @ 07:00  --------------------------------------------------------  IN: 1680 mL / OUT: 1500 mL / NET: 180 mL    12-04 @ 07:01  -  12-04 @ 13:56  --------------------------------------------------------  IN: 380 mL / OUT: 350 mL / NET: 30 mL      CAPILLARY BLOOD GLUCOSE          PHYSICAL EXAM:  GEN: Male in NAD on RA  HEENT: NC/AT, MMM  CV: RRR, nml S1S2, no murmurs  PULM: nml effort, CTAB  ABD: Soft, non-distended, NABS, non-tender  NEURO  A/O x3, moving all extremities, R hip dressing c/d/i. 5/5 in plantarflex/ext. Sensation intact  PSYCH: Appropriate      MEDICATIONS:  MEDICATIONS  (STANDING):  acetaminophen     Tablet .. 1000 milliGRAM(s) Oral every 8 hours  aspirin enteric coated 81 milliGRAM(s) Oral two times a day  folic acid 1 milliGRAM(s) Oral daily  lactated ringers. 1000 milliLiter(s) (100 mL/Hr) IV Continuous <Continuous>  losartan 50 milliGRAM(s) Oral daily  multivitamin 1 Tablet(s) Oral daily  pantoprazole    Tablet 40 milliGRAM(s) Oral before breakfast  polyethylene glycol 3350 17 Gram(s) Oral at bedtime  selegiline Oral Tab/Cap 5 milliGRAM(s) Oral <User Schedule>  senna 2 Tablet(s) Oral at bedtime  tamsulosin 0.4 milliGRAM(s) Oral at bedtime  venlafaxine XR. 150 milliGRAM(s) Oral daily    MEDICATIONS  (PRN):  ALPRAZolam 0.5 milliGRAM(s) Oral daily PRN anxiety  aluminum hydroxide/magnesium hydroxide/simethicone Suspension 30 milliLiter(s) Oral four times a day PRN Indigestion  bisacodyl Suppository 10 milliGRAM(s) Rectal once PRN Constipation  HYDROmorphone   Tablet 2 milliGRAM(s) Oral every 4 hours PRN Moderate Pain (4 - 6)  HYDROmorphone   Tablet 4 milliGRAM(s) Oral every 4 hours PRN Severe Pain (7 - 10)  HYDROmorphone  Injectable 0.5 milliGRAM(s) IV Push every 15 minutes PRN PACU  HYDROmorphone  Injectable 0.5 milliGRAM(s) IV Push every 4 hours PRN Breakthrough pain  magnesium hydroxide Suspension 30 milliLiter(s) Oral daily PRN Constipation  melatonin 5 milliGRAM(s) Oral at bedtime PRN Insomnia  ondansetron Injectable 4 milliGRAM(s) IV Push every 6 hours PRN Nausea and/or Vomiting      ALLERGIES:  Allergies    No Known Allergies    Intolerances        LABS:                        9.4    8.61  )-----------( 213      ( 04 Dec 2023 06:09 )             29.2     12-04    136  |  104  |  26<H>  ----------------------------<  115<H>  4.0   |  24  |  0.68    Ca    8.1<L>      04 Dec 2023 06:09        Urinalysis Basic - ( 04 Dec 2023 06:09 )    Color: x / Appearance: x / SG: x / pH: x  Gluc: 115 mg/dL / Ketone: x  / Bili: x / Urobili: x   Blood: x / Protein: x / Nitrite: x   Leuk Esterase: x / RBC: x / WBC x   Sq Epi: x / Non Sq Epi: x / Bacteria: x        RADIOLOGY & ADDITIONAL TESTS: Reviewed.

## 2023-12-04 NOTE — OCCUPATIONAL THERAPY INITIAL EVALUATION ADULT - PERTINENT HX OF CURRENT PROBLEM, REHAB EVAL
78y M pmhx depression. Pt w/ h/o spine surgery with Dr. Schwab, and ankle fracture 6 weeks ago treated nonoperatively presents with left hip pain. Patient had a mechanical fall. Patient denies head strike, or LOC. Patient denies any numbness/tingling in the affected extremity. The patient normally ambulates without assistance at baseline. They deny any other orthopedic injuries at this time.

## 2023-12-04 NOTE — OCCUPATIONAL THERAPY INITIAL EVALUATION ADULT - ADDITIONAL COMMENTS
Pt lives alone in apt. Pt states that he was independent prior to incident and w/o prior use of AEs/DMEs.

## 2023-12-05 LAB
ALBUMIN SERPL ELPH-MCNC: 3.1 G/DL — LOW (ref 3.3–5)
ALBUMIN SERPL ELPH-MCNC: 3.1 G/DL — LOW (ref 3.3–5)
ALP SERPL-CCNC: 96 U/L — SIGNIFICANT CHANGE UP (ref 40–120)
ALP SERPL-CCNC: 96 U/L — SIGNIFICANT CHANGE UP (ref 40–120)
ALT FLD-CCNC: 18 U/L — SIGNIFICANT CHANGE UP (ref 10–45)
ALT FLD-CCNC: 18 U/L — SIGNIFICANT CHANGE UP (ref 10–45)
APPEARANCE UR: CLEAR — SIGNIFICANT CHANGE UP
APPEARANCE UR: CLEAR — SIGNIFICANT CHANGE UP
AST SERPL-CCNC: 27 U/L — SIGNIFICANT CHANGE UP (ref 10–40)
AST SERPL-CCNC: 27 U/L — SIGNIFICANT CHANGE UP (ref 10–40)
BACTERIA # UR AUTO: NEGATIVE /HPF — SIGNIFICANT CHANGE UP
BACTERIA # UR AUTO: NEGATIVE /HPF — SIGNIFICANT CHANGE UP
BILIRUB DIRECT SERPL-MCNC: <0.2 MG/DL — SIGNIFICANT CHANGE UP (ref 0–0.3)
BILIRUB DIRECT SERPL-MCNC: <0.2 MG/DL — SIGNIFICANT CHANGE UP (ref 0–0.3)
BILIRUB INDIRECT FLD-MCNC: SIGNIFICANT CHANGE UP MG/DL (ref 0.2–1)
BILIRUB INDIRECT FLD-MCNC: SIGNIFICANT CHANGE UP MG/DL (ref 0.2–1)
BILIRUB SERPL-MCNC: 0.4 MG/DL — SIGNIFICANT CHANGE UP (ref 0.2–1.2)
BILIRUB SERPL-MCNC: 0.4 MG/DL — SIGNIFICANT CHANGE UP (ref 0.2–1.2)
BILIRUB UR-MCNC: NEGATIVE — SIGNIFICANT CHANGE UP
BILIRUB UR-MCNC: NEGATIVE — SIGNIFICANT CHANGE UP
COLOR SPEC: YELLOW — SIGNIFICANT CHANGE UP
COLOR SPEC: YELLOW — SIGNIFICANT CHANGE UP
DIFF PNL FLD: ABNORMAL
DIFF PNL FLD: ABNORMAL
GLUCOSE UR QL: NEGATIVE MG/DL — SIGNIFICANT CHANGE UP
GLUCOSE UR QL: NEGATIVE MG/DL — SIGNIFICANT CHANGE UP
KETONES UR-MCNC: NEGATIVE MG/DL — SIGNIFICANT CHANGE UP
KETONES UR-MCNC: NEGATIVE MG/DL — SIGNIFICANT CHANGE UP
LEUKOCYTE ESTERASE UR-ACNC: ABNORMAL
LEUKOCYTE ESTERASE UR-ACNC: ABNORMAL
NITRITE UR-MCNC: NEGATIVE — SIGNIFICANT CHANGE UP
NITRITE UR-MCNC: NEGATIVE — SIGNIFICANT CHANGE UP
PH UR: 7 — SIGNIFICANT CHANGE UP (ref 5–8)
PH UR: 7 — SIGNIFICANT CHANGE UP (ref 5–8)
PROT SERPL-MCNC: 5.5 G/DL — LOW (ref 6–8.3)
PROT SERPL-MCNC: 5.5 G/DL — LOW (ref 6–8.3)
PROT UR-MCNC: SIGNIFICANT CHANGE UP MG/DL
PROT UR-MCNC: SIGNIFICANT CHANGE UP MG/DL
RBC CASTS # UR COMP ASSIST: 38 /HPF — HIGH (ref 0–4)
RBC CASTS # UR COMP ASSIST: 38 /HPF — HIGH (ref 0–4)
SARS-COV-2 RNA SPEC QL NAA+PROBE: NEGATIVE — SIGNIFICANT CHANGE UP
SARS-COV-2 RNA SPEC QL NAA+PROBE: NEGATIVE — SIGNIFICANT CHANGE UP
SP GR SPEC: 1.01 — SIGNIFICANT CHANGE UP (ref 1–1.03)
SP GR SPEC: 1.01 — SIGNIFICANT CHANGE UP (ref 1–1.03)
SQUAMOUS # UR AUTO: 0 /HPF — SIGNIFICANT CHANGE UP (ref 0–5)
SQUAMOUS # UR AUTO: 0 /HPF — SIGNIFICANT CHANGE UP (ref 0–5)
UROBILINOGEN FLD QL: 0.2 MG/DL — SIGNIFICANT CHANGE UP (ref 0.2–1)
UROBILINOGEN FLD QL: 0.2 MG/DL — SIGNIFICANT CHANGE UP (ref 0.2–1)
WBC UR QL: 3 /HPF — SIGNIFICANT CHANGE UP (ref 0–5)
WBC UR QL: 3 /HPF — SIGNIFICANT CHANGE UP (ref 0–5)

## 2023-12-05 PROCEDURE — 93306 TTE W/DOPPLER COMPLETE: CPT | Mod: 26

## 2023-12-05 PROCEDURE — 99233 SBSQ HOSP IP/OBS HIGH 50: CPT

## 2023-12-05 PROCEDURE — 93010 ELECTROCARDIOGRAM REPORT: CPT

## 2023-12-05 RX ORDER — OLANZAPINE 15 MG/1
2.5 TABLET, FILM COATED ORAL ONCE
Refills: 0 | Status: COMPLETED | OUTPATIENT
Start: 2023-12-05 | End: 2023-12-05

## 2023-12-05 RX ADMIN — Medication 150 MILLIGRAM(S): at 13:41

## 2023-12-05 RX ADMIN — Medication 81 MILLIGRAM(S): at 18:14

## 2023-12-05 RX ADMIN — SENNA PLUS 2 TABLET(S): 8.6 TABLET ORAL at 21:02

## 2023-12-05 RX ADMIN — Medication 1 MILLIGRAM(S): at 13:41

## 2023-12-05 RX ADMIN — SELEGILINE HYDROCHLORIDE 5 MILLIGRAM(S): 1.25 TABLET, ORALLY DISINTEGRATING ORAL at 07:21

## 2023-12-05 RX ADMIN — Medication 81 MILLIGRAM(S): at 05:17

## 2023-12-05 RX ADMIN — HYDROMORPHONE HYDROCHLORIDE 4 MILLIGRAM(S): 2 INJECTION INTRAMUSCULAR; INTRAVENOUS; SUBCUTANEOUS at 23:00

## 2023-12-05 RX ADMIN — PANTOPRAZOLE SODIUM 40 MILLIGRAM(S): 20 TABLET, DELAYED RELEASE ORAL at 05:18

## 2023-12-05 RX ADMIN — HYDROMORPHONE HYDROCHLORIDE 4 MILLIGRAM(S): 2 INJECTION INTRAMUSCULAR; INTRAVENOUS; SUBCUTANEOUS at 22:01

## 2023-12-05 RX ADMIN — Medication 1000 MILLIGRAM(S): at 21:02

## 2023-12-05 RX ADMIN — OLANZAPINE 2.5 MILLIGRAM(S): 15 TABLET, FILM COATED ORAL at 01:39

## 2023-12-05 RX ADMIN — LOSARTAN POTASSIUM 50 MILLIGRAM(S): 100 TABLET, FILM COATED ORAL at 05:17

## 2023-12-05 RX ADMIN — POLYETHYLENE GLYCOL 3350 17 GRAM(S): 17 POWDER, FOR SOLUTION ORAL at 21:02

## 2023-12-05 RX ADMIN — TAMSULOSIN HYDROCHLORIDE 0.4 MILLIGRAM(S): 0.4 CAPSULE ORAL at 21:02

## 2023-12-05 RX ADMIN — Medication 1000 MILLIGRAM(S): at 13:40

## 2023-12-05 RX ADMIN — Medication 1 TABLET(S): at 13:41

## 2023-12-05 RX ADMIN — Medication 1000 MILLIGRAM(S): at 05:17

## 2023-12-05 NOTE — PROVIDER CONTACT NOTE (MEDICATION) - ASSESSMENT
Playing with his IV and scratching his head. Pt appears restless. States "skin is crawling and is seeing people that are not there". In addition, he is playing with his IV and scratching his head.

## 2023-12-05 NOTE — PROVIDER CONTACT NOTE (OTHER) - ASSESSMENT
Pt experiencing abdominal discomfort & pressure. Pt attempted to void standing up but unsuccessful. Pt experiencing abdominal discomfort & pressure.

## 2023-12-05 NOTE — PROGRESS NOTE ADULT - SUBJECTIVE AND OBJECTIVE BOX
INTERVAL HPI/OVERNIGHT EVENTS: tom o/n    SUBJECTIVE: Patient seen and examined at bedside.   Pt reports room being very loud overnight leading him to feel agitated.  Reports burning w urination after rios removal yesterday. Reports last instance of UTI/dysuria was 5y ago after urologic procedure.  Reports pain controlled in ankle. No LH/dizziness, chest pain, dyspnea. Eating wo N/V/Abd pain. +Flatus wo BM. Denies fever.    OBJECTIVE:    VITAL SIGNS:  ICU Vital Signs Last 24 Hrs  T(C): 37.4 (05 Dec 2023 09:16), Max: 37.5 (04 Dec 2023 20:08)  T(F): 99.3 (05 Dec 2023 09:16), Max: 99.5 (04 Dec 2023 20:08)  HR: 81 (05 Dec 2023 15:04) (77 - 87)  BP: 165/85 (05 Dec 2023 15:04) (152/84 - 189/88)  BP(mean): --  ABP: --  ABP(mean): --  RR: 17 (05 Dec 2023 13:48) (17 - 19)  SpO2: 95% (05 Dec 2023 15:04) (91% - 98%)    O2 Parameters below as of 05 Dec 2023 15:04  Patient On (Oxygen Delivery Method): room air               @ :  -   @ 07:00  --------------------------------------------------------  IN: 580 mL / OUT: 1300 mL / NET: -720 mL     @ 07:  -   @ 15:44  --------------------------------------------------------  IN: 150 mL / OUT: 500 mL / NET: -350 mL      CAPILLARY BLOOD GLUCOSE          PHYSICAL EXAM:  GEN: Male in NAD on RA  HEENT: NC/AT, MMM  CV: RRR, nml S1S2, no murmurs  PULM: nml effort, CTAB  ABD: Soft, non-distended, NABS, non-tender  NEURO  A/O x3, moving all extremities, R hip dressing c/d/i. 5/5 in plantarflex/ext. Sensation intact  PSYCH: Appropriate      MEDICATIONS:  MEDICATIONS  (STANDING):  acetaminophen     Tablet .. 1000 milliGRAM(s) Oral every 8 hours  aspirin enteric coated 81 milliGRAM(s) Oral two times a day  folic acid 1 milliGRAM(s) Oral daily  lactated ringers. 1000 milliLiter(s) (100 mL/Hr) IV Continuous <Continuous>  losartan 50 milliGRAM(s) Oral daily  multivitamin 1 Tablet(s) Oral daily  pantoprazole    Tablet 40 milliGRAM(s) Oral before breakfast  polyethylene glycol 3350 17 Gram(s) Oral at bedtime  selegiline Oral Tab/Cap 5 milliGRAM(s) Oral <User Schedule>  senna 2 Tablet(s) Oral at bedtime  tamsulosin 0.4 milliGRAM(s) Oral at bedtime  venlafaxine XR. 150 milliGRAM(s) Oral daily    MEDICATIONS  (PRN):  ALPRAZolam 0.5 milliGRAM(s) Oral daily PRN anxiety  aluminum hydroxide/magnesium hydroxide/simethicone Suspension 30 milliLiter(s) Oral four times a day PRN Indigestion  bisacodyl Suppository 10 milliGRAM(s) Rectal once PRN Constipation  HYDROmorphone   Tablet 2 milliGRAM(s) Oral every 4 hours PRN Moderate Pain (4 - 6)  HYDROmorphone   Tablet 4 milliGRAM(s) Oral every 4 hours PRN Severe Pain (7 - 10)  HYDROmorphone  Injectable 0.5 milliGRAM(s) IV Push every 4 hours PRN Breakthrough pain  LORazepam   Injectable 1 milliGRAM(s) IV Push every 2 hours PRN CIWA-Ar score increase by 2 points and a total score of 7 or less  magnesium hydroxide Suspension 30 milliLiter(s) Oral daily PRN Constipation  melatonin 5 milliGRAM(s) Oral at bedtime PRN Insomnia  ondansetron Injectable 4 milliGRAM(s) IV Push every 6 hours PRN Nausea and/or Vomiting      ALLERGIES:  Allergies    No Known Allergies    Intolerances        LABS:                        9.4    8.61  )-----------( 213      ( 04 Dec 2023 06:09 )             29.2     12-    136  |  104  |  26<H>  ----------------------------<  115<H>  4.0   |  24  |  0.68    Ca    8.1<L>      04 Dec 2023 06:09    TPro  5.5<L>  /  Alb  3.1<L>  /  TBili  0.4  /  DBili  <0.2  /  AST  27  /  ALT  18  /  AlkPhos  96  12-05      Urinalysis Basic - ( 05 Dec 2023 10:59 )    Color: Yellow / Appearance: Clear / S.013 / pH: x  Gluc: x / Ketone: Negative mg/dL  / Bili: Negative / Urobili: 0.2 mg/dL   Blood: x / Protein: Trace mg/dL / Nitrite: Negative   Leuk Esterase: Trace / RBC: 38 /HPF / WBC 3 /HPF   Sq Epi: x / Non Sq Epi: 0 /HPF / Bacteria: Negative /HPF        RADIOLOGY & ADDITIONAL TESTS: Reviewed.

## 2023-12-05 NOTE — PROVIDER CONTACT NOTE (MEDICATION) - SITUATION
Pt appears restless. States "skin is crawling and is seeing people that are not there". Pt experiencing a sundowning episode.

## 2023-12-05 NOTE — PROVIDER CONTACT NOTE (OTHER) - BACKGROUND
78 y Male. Preop for thr.
S/p left hip daniella 12/2
S/p L hip daniella 12/2
S/p left hip daniella 12/2

## 2023-12-05 NOTE — PROVIDER CONTACT NOTE (OTHER) - ACTION/TREATMENT ORDERED:
Straight cath ordered and not given due to pt voiding. Straight cath ordered and not given due to pt voiding beforehand. Straight cath ordered and not given due to pt voiding 300 ml before straight cath given.

## 2023-12-05 NOTE — PROVIDER CONTACT NOTE (OTHER) - SITUATION
Bladder scan showed 370 ml of urine. Afterwards, pt voided 300 ml. Bladder scan showed 370 ml of urine.

## 2023-12-05 NOTE — PROGRESS NOTE ADULT - SUBJECTIVE AND OBJECTIVE BOX
Ortho Note    Pt comfortable without complaints, pain controlled  Denies CP, SOB, N/V, new numbness/tingling     Vital Signs Last 24 Hrs  T(C): 37.4 (12-05-23 @ 09:16), Max: 37.4 (12-05-23 @ 05:16)  T(F): 99.3 (12-05-23 @ 09:16), Max: 99.4 (12-05-23 @ 05:16)  HR: 80 (12-05-23 @ 09:16) (80 - 87)  BP: 156/80 (12-05-23 @ 09:16) (156/80 - 189/88)  BP(mean): --  RR: 19 (12-05-23 @ 09:16) (18 - 19)  SpO2: 92% (12-05-23 @ 09:16) (92% - 97%)  I&O's Summary    04 Dec 2023 07:01  -  05 Dec 2023 07:00  --------------------------------------------------------  IN: 580 mL / OUT: 1300 mL / NET: -720 mL    05 Dec 2023 07:01  -  05 Dec 2023 11:00  --------------------------------------------------------  IN: 150 mL / OUT: 300 mL / NET: -150 mL        General: Pt Alert and oriented, NAD  DSG C/D/I-   Pulses:  Sensation:  Motor:   EHL/FHL/TA/GS                          9.4    8.61  )-----------( 213      ( 04 Dec 2023 06:09 )             29.2     12-04    136  |  104  |  26<H>  ----------------------------<  115<H>  4.0   |  24  |  0.68    Ca    8.1<L>      04 Dec 2023 06:09        A/P: 78yMale s/p   - Stable  - Pain Control  - DVT ppx:  - PT, WBS:     Ortho Pager [phone] Ortho Note    Pt comfortable without complaints, pain controlled  Denies CP, SOB, N/V, new numbness/tingling     Vital Signs Last 24 Hrs  T(C): 37.4 (12-05-23 @ 09:16), Max: 37.4 (12-05-23 @ 05:16)  T(F): 99.3 (12-05-23 @ 09:16), Max: 99.4 (12-05-23 @ 05:16)  HR: 80 (12-05-23 @ 09:16) (80 - 87)  BP: 156/80 (12-05-23 @ 09:16) (156/80 - 189/88)  BP(mean): --  RR: 19 (12-05-23 @ 09:16) (18 - 19)  SpO2: 92% (12-05-23 @ 09:16) (92% - 97%)  I&O's Summary    04 Dec 2023 07:01  -  05 Dec 2023 07:00  --------------------------------------------------------  IN: 580 mL / OUT: 1300 mL / NET: -720 mL    05 Dec 2023 07:01  -  05 Dec 2023 11:00  --------------------------------------------------------  IN: 150 mL / OUT: 300 mL / NET: -150 mL        General: Pt Alert and oriented, NAD  DSG C/D/I-   Pulses:  Sensation:  Motor:   EHL/FHL/TA/GS                          9.4    8.61  )-----------( 213      ( 04 Dec 2023 06:09 )             29.2     12-04    136  |  104  |  26<H>  ----------------------------<  115<H>  4.0   |  24  |  0.68    Ca    8.1<L>      04 Dec 2023 06:09        A/P: 78yMale s/p   - Stable  - Pain Control  - DVT ppx:  - PT, WBS:     Ortho Pager [phone] Ortho Note    Pt seen and examined at bedside. Frustrated overnight with being told he needed to stay in bed. States he was not confused overnight, but became agitated and could not calm down. Reports left ankle is improving. States pain in left hip is controlled. Reports he has been urinating, however, reports increased urgency, dysuria. Denies hematuria. Denies flank pain. Denies recent hx of UTI.     Denies fever, chills CP, SOB, N/V, new numbness/tingling, denies feeling anxious this am.    Vital Signs Last 24 Hrs  T(C): 37.4 (12-05-23 @ 09:16), Max: 37.4 (12-05-23 @ 05:16)  T(F): 99.3 (12-05-23 @ 09:16), Max: 99.4 (12-05-23 @ 05:16)  HR: 80 (12-05-23 @ 09:16) (80 - 87)  BP: 156/80 (12-05-23 @ 09:16) (156/80 - 189/88)  BP(mean): --  RR: 19 (12-05-23 @ 09:16) (18 - 19)  SpO2: 92% (12-05-23 @ 09:16) (92% - 97%)  I&O's Summary    04 Dec 2023 07:01  -  05 Dec 2023 07:00  --------------------------------------------------------  IN: 580 mL / OUT: 1300 mL / NET: -720 mL    05 Dec 2023 07:01  -  05 Dec 2023 11:00  --------------------------------------------------------  IN: 150 mL / OUT: 300 mL / NET: -150 mL        General: Pt Alert and oriented, NAD  DSG C/D/I- left hip aquacel, left foot cam boot   Pulses: 2+ DP bilaterally  Sensation: SILT distally bilateral lower extremities  Motor:   EHL/FHL/TA/GS: 5/5 bilaterally with cam boot removed                          9.4    8.61  )-----------( 213      ( 04 Dec 2023 06:09 )             29.2     12-04    136  |  104  |  26<H>  ----------------------------<  115<H>  4.0   |  24  |  0.68    Ca    8.1<L>      04 Dec 2023 06:09        A/P: 78yMale POD #3 s/p left hip hemiarthroplasty with Dr. Moore  - Stable, afebrile, will bladder scan and send UA and reflex culture  - Pain Control  - Appreciate medicine recs- echo today  - Continue bowel regimen  - Left ankle cam boot for comfort, may dc boot while in bed  - DVT ppx: aspirin 81mg oral twice a day  - PT, WBS: WBAT   - Dispo: KELSIE     Ortho Pager [phone] Ortho Note    Pt seen and examined at bedside. Frustrated overnight with being told he needed to stay in bed. States he was not confused overnight, but became agitated and could not calm down. Reports left ankle is improving. States pain in left hip is controlled. Reports he has been urinating, however, reports increased urgency, dysuria. Denies hematuria. Denies flank pain. Denies recent hx of UTI.     Denies fever, chills CP, SOB, N/V, new numbness/tingling, denies feeling anxious this am.    Vital Signs Last 24 Hrs  T(C): 37.4 (12-05-23 @ 09:16), Max: 37.4 (12-05-23 @ 05:16)  T(F): 99.3 (12-05-23 @ 09:16), Max: 99.4 (12-05-23 @ 05:16)  HR: 80 (12-05-23 @ 09:16) (80 - 87)  BP: 156/80 (12-05-23 @ 09:16) (156/80 - 189/88)  BP(mean): --  RR: 19 (12-05-23 @ 09:16) (18 - 19)  SpO2: 92% (12-05-23 @ 09:16) (92% - 97%)  I&O's Summary    04 Dec 2023 07:01  -  05 Dec 2023 07:00  --------------------------------------------------------  IN: 580 mL / OUT: 1300 mL / NET: -720 mL    05 Dec 2023 07:01  -  05 Dec 2023 11:00  --------------------------------------------------------  IN: 150 mL / OUT: 300 mL / NET: -150 mL        General: Pt Alert and oriented, NAD  DSG C/D/I- left hip aquacel, left foot cam boot   Pulses: 2+ DP bilaterally  Sensation: SILT distally bilateral lower extremities  Motor:   EHL/FHL/TA/GS: 5/5 bilaterally with cam boot removed                          9.4    8.61  )-----------( 213      ( 04 Dec 2023 06:09 )             29.2     12-04    136  |  104  |  26<H>  ----------------------------<  115<H>  4.0   |  24  |  0.68    Ca    8.1<L>      04 Dec 2023 06:09        A/P: 78yMale POD #3 s/p left hip hemiarthroplasty with Dr. Moore  - Stable, afebrile, will bladder scan and send UA and reflex culture  - Pain Control  - Appreciate medicine recs- echo today  - Continue bowel regimen  - Left ankle cam boot for comfort, may dc boot while in bed  - DVT ppx: aspirin 81mg oral twice a day  - PT, WBS: WBAT   - Dispo: KELSIE     Ortho Pager [phone] Ortho Note    Pt seen and examined at bedside. Frustrated overnight with being told he needed to stay in bed. States he was not confused overnight, but became agitated and could not calm down. Reports left ankle is improving. States pain in left hip is controlled. Reports he has been urinating, however, reports increased urgency, dysuria. Denies hematuria. Denies flank pain. Denies recent hx of UTI. Pt states he broke his left ankle 5 weeks ago and was scheduled for follow up either today or tomorrow with Dr. Eddy.     Denies fever, chills CP, SOB, N/V, new numbness/tingling, denies feeling anxious this am, denies hx of alcohol use, denies hx of benzo withdrawal.     Vital Signs Last 24 Hrs  T(C): 37.4 (12-05-23 @ 09:16), Max: 37.4 (12-05-23 @ 05:16)  T(F): 99.3 (12-05-23 @ 09:16), Max: 99.4 (12-05-23 @ 05:16)  HR: 80 (12-05-23 @ 09:16) (80 - 87)  BP: 156/80 (12-05-23 @ 09:16) (156/80 - 189/88)  BP(mean): --  RR: 19 (12-05-23 @ 09:16) (18 - 19)  SpO2: 92% (12-05-23 @ 09:16) (92% - 97%)  I&O's Summary    04 Dec 2023 07:01  -  05 Dec 2023 07:00  --------------------------------------------------------  IN: 580 mL / OUT: 1300 mL / NET: -720 mL    05 Dec 2023 07:01  -  05 Dec 2023 11:00  --------------------------------------------------------  IN: 150 mL / OUT: 300 mL / NET: -150 mL        General: Pt Alert and oriented, NAD  DSG C/D/I- left hip aquacel, left foot cam boot   Pulses: 2+ DP bilaterally  Sensation: SILT distally bilateral lower extremities  Motor:   EHL/FHL/TA/GS: 5/5 bilaterally with cam boot removed                          9.4    8.61  )-----------( 213      ( 04 Dec 2023 06:09 )             29.2     12-04    136  |  104  |  26<H>  ----------------------------<  115<H>  4.0   |  24  |  0.68    Ca    8.1<L>      04 Dec 2023 06:09        A/P: 78yMale POD #3 s/p left hip hemiarthroplasty with Dr. Moore  - Stable, afebrile, will bladder scan and send UA and reflex culture  - UA negative, continue to bladder scan and monitor for urinary retention  - Pain Control  - Appreciate medicine recs- echo today  - Continue bowel regimen  - Left ankle cam boot for comfort, may dc boot while in bed  - DVT ppx: aspirin 81mg oral twice a day  - PT, WBS: WBAT   - Dispo: KELSIE     Ortho Pager [phone] Ortho Note    Pt seen and examined at bedside. Frustrated overnight with being told he needed to stay in bed. States he was not confused overnight, but became agitated and could not calm down. Reports left ankle is improving. States pain in left hip is controlled. Reports he has been urinating, however, reports increased urgency, dysuria. Denies hematuria. Denies flank pain. Denies recent hx of UTI. Pt states he broke his left ankle 5 weeks ago and was scheduled for follow up either today or tomorrow with Dr. Eddy.     Denies fever, chills CP, SOB, N/V, new numbness/tingling, denies feeling anxious this am, denies hx of alcohol use, denies hx of benzo withdrawal.     Vital Signs Last 24 Hrs  T(C): 37.4 (12-05-23 @ 09:16), Max: 37.4 (12-05-23 @ 05:16)  T(F): 99.3 (12-05-23 @ 09:16), Max: 99.4 (12-05-23 @ 05:16)  HR: 80 (12-05-23 @ 09:16) (80 - 87)  BP: 156/80 (12-05-23 @ 09:16) (156/80 - 189/88)  BP(mean): --  RR: 19 (12-05-23 @ 09:16) (18 - 19)  SpO2: 92% (12-05-23 @ 09:16) (92% - 97%)  I&O's Summary    04 Dec 2023 07:01  -  05 Dec 2023 07:00  --------------------------------------------------------  IN: 580 mL / OUT: 1300 mL / NET: -720 mL    05 Dec 2023 07:01  -  05 Dec 2023 11:00  --------------------------------------------------------  IN: 150 mL / OUT: 300 mL / NET: -150 mL        General: Pt Alert and oriented, NAD  DSG C/D/I- left hip aquacel, left foot cam boot   Pulses: 2+ DP bilaterally  Sensation: SILT distally bilateral lower extremities  Motor:   EHL/FHL/TA/GS: 5/5 bilaterally with cam boot removed                          9.4    8.61  )-----------( 213      ( 04 Dec 2023 06:09 )             29.2     12-04    136  |  104  |  26<H>  ----------------------------<  115<H>  4.0   |  24  |  0.68    Ca    8.1<L>      04 Dec 2023 06:09        A/P: 78yMale POD #3 s/p left hip hemiarthroplasty with Dr. Moore  - Stable, afebrile, will bladder scan and send UA and reflex culture  - UA negative, continue to bladder scan and monitor for urinary retention  - Pain Control  - Appreciate medicine recs- echo today  - Continue bowel regimen  - Left ankle cam boot for comfort, may dc boot while in bed  - DVT ppx: aspirin 81mg oral twice a day  - PT, WBS: WBAT   - Dispo: KELSIE     Ortho Pager [phone] Ortho Note    Pt seen and examined at bedside. Frustrated overnight with being told he needed to stay in bed. States he was not confused overnight, but became agitated and could not calm down. Reports left ankle is improving. States pain in left hip is controlled. Reports he has been urinating, however, reports increased urgency, dysuria. Denies hematuria. Denies flank pain. Denies recent hx of UTI. Pt states he broke his left ankle 5 weeks ago and was scheduled for follow up either today or tomorrow with Dr. Eddy.     Denies fever, chills CP, SOB, N/V, new numbness/tingling, denies feeling anxious this am, denies hx of alcohol use, denies hx of benzo withdrawal.     Vital Signs Last 24 Hrs  T(C): 37.4 (12-05-23 @ 09:16), Max: 37.4 (12-05-23 @ 05:16)  T(F): 99.3 (12-05-23 @ 09:16), Max: 99.4 (12-05-23 @ 05:16)  HR: 80 (12-05-23 @ 09:16) (80 - 87)  BP: 156/80 (12-05-23 @ 09:16) (156/80 - 189/88)  BP(mean): --  RR: 19 (12-05-23 @ 09:16) (18 - 19)  SpO2: 92% (12-05-23 @ 09:16) (92% - 97%)  I&O's Summary    04 Dec 2023 07:01  -  05 Dec 2023 07:00  --------------------------------------------------------  IN: 580 mL / OUT: 1300 mL / NET: -720 mL    05 Dec 2023 07:01  -  05 Dec 2023 11:00  --------------------------------------------------------  IN: 150 mL / OUT: 300 mL / NET: -150 mL        General: Pt Alert and oriented, NAD  DSG C/D/I- left hip aquacel, left foot cam boot   Pulses: 2+ DP bilaterally  Sensation: SILT distally bilateral lower extremities  Motor:   EHL/FHL/TA/GS: 5/5 bilaterally with cam boot removed                          9.4    8.61  )-----------( 213      ( 04 Dec 2023 06:09 )             29.2     12-04    136  |  104  |  26<H>  ----------------------------<  115<H>  4.0   |  24  |  0.68    Ca    8.1<L>      04 Dec 2023 06:09        A/P: 78yMale POD #3 s/p left hip hemiarthroplasty with Dr. Moore  - Stable, afebrile, will bladder scan and send UA and reflex culture  - UA negative, continue to bladder scan and monitor for urinary retention  - CIWA score per nursing documentation zero- will dc CIWA protocol  - Pain Control  - Appreciate medicine recs- echo today  - Continue bowel regimen  - Left ankle cam boot for comfort, may dc boot while in bed  - DVT ppx: aspirin 81mg oral twice a day  - PT, WBS: WBAT   - Dispo: KELSIE     Ortho Pager [phone] Ortho Note    Pt seen and examined at bedside. Frustrated overnight with being told he needed to stay in bed. States he was not confused overnight, but became agitated and could not calm down. Reports left ankle is improving. States pain in left hip is controlled. Reports he has been urinating, however, reports increased urgency, dysuria. Denies hematuria. Denies flank pain. Denies recent hx of UTI. Pt states he broke his left ankle 5 weeks ago and was scheduled for follow up either today or tomorrow with Dr. Eddy.     Denies fever, chills CP, SOB, N/V, new numbness/tingling, denies feeling anxious this am, denies hx of alcohol use, denies hx of benzo withdrawal.     Vital Signs Last 24 Hrs  T(C): 37.4 (12-05-23 @ 09:16), Max: 37.4 (12-05-23 @ 05:16)  T(F): 99.3 (12-05-23 @ 09:16), Max: 99.4 (12-05-23 @ 05:16)  HR: 80 (12-05-23 @ 09:16) (80 - 87)  BP: 156/80 (12-05-23 @ 09:16) (156/80 - 189/88)  BP(mean): --  RR: 19 (12-05-23 @ 09:16) (18 - 19)  SpO2: 92% (12-05-23 @ 09:16) (92% - 97%)  I&O's Summary    04 Dec 2023 07:01  -  05 Dec 2023 07:00  --------------------------------------------------------  IN: 580 mL / OUT: 1300 mL / NET: -720 mL    05 Dec 2023 07:01  -  05 Dec 2023 11:00  --------------------------------------------------------  IN: 150 mL / OUT: 300 mL / NET: -150 mL        General: Pt Alert and oriented, NAD  DSG C/D/I- left hip aquacel, left foot cam boot   Pulses: 2+ DP bilaterally  Sensation: SILT distally bilateral lower extremities  Motor:   EHL/FHL/TA/GS: 5/5 bilaterally with cam boot removed                          9.4    8.61  )-----------( 213      ( 04 Dec 2023 06:09 )             29.2     12-04    136  |  104  |  26<H>  ----------------------------<  115<H>  4.0   |  24  |  0.68    Ca    8.1<L>      04 Dec 2023 06:09        A/P: 78yMale POD #3 s/p left hip hemiarthroplasty with Dr. Moore  - Stable, afebrile, will bladder scan and send UA and reflex culture  - UA negative, continue to bladder scan and monitor for urinary retention  - CIWA score per nursing documentation zero- will dc CIWA protocol  - Pain Control  - Appreciate medicine recs- echo today  - Continue bowel regimen  - Left ankle cam boot for comfort, may dc boot while in bed  - DVT ppx: aspirin 81mg oral twice a day  - PT, WBS: WBAT   - Dispo: KELSIE     Ortho Pager [phone]

## 2023-12-05 NOTE — PROGRESS NOTE ADULT - ASSESSMENT
78M w HTN, BPH, spinal surgery - L4 spinal fusion, depression, history of 3 falls c/f syncopal episodes is last several months, p/w syncopal episode found to have L femoral neck fracture s/p L hip hemiarthroplasty 12/2 w Dr. Moore    #Dysuria    #Syncopal episode - pt denies prodromal symptoms but felt he fell forward after getting up out of bed. Reports outpatient work-up with Dr. Yung/Brendan Ansari at [facility] EKG/TTE being unremarkable. Pt recalls being told possible contributing factor was doxazosin - suspect orthostatic hypotension but this has been the only medication that has been effective for his BPH   -TTE shows normal LVEF. Slightly elevated PASP    #Post-op state. Pain controlled. PPx: ASA 81 BID. On bowel regimen and incentive spirometer  #L femoral fx   #Osteoporosis - TSH, PTH nml   -- discussed osteoporosis dx w patient. Recommend outpatient DEXA scan and to start bisphosphonates    #Blood loss anemia - No labs today -- 9.4 today. Was 11.5 on admission. Appears asymptomatic  #HTN - c/w home losartan  #Fibromylagia - no longer on lyrica per patient  #Depression - home on selegiline 5mg AM and venlafaxine 150mg daily    Plan  Regarding dysuria: obtain UA -- clean thus unlikely UTI  Obtain bladder scan q6h. Pain may be d/t irritation after rios removal  Optimize pain  f/u 25,OH-D    DISPO: KELSIE   78M w HTN, BPH, spinal surgery - L4 spinal fusion, depression, history of 3 falls c/f syncopal episodes is last several months, p/w syncopal episode found to have L femoral neck fracture s/p L hip hemiarthroplasty 12/2 w Dr. Moore    #Dysuria    #Syncopal episode - pt denies prodromal symptoms but felt he fell forward after getting up out of bed. Reports outpatient work-up with Dr. Yung/Brendan Ansari at [facility] EKG/TTE being unremarkable. Pt recalls being told possible contributing factor was doxazosin - suspect orthostatic hypotension but this has been the only medication that has been effective for his BPH   -TTE shows normal LVEF. Slightly elevated PASP    #Post-op state. Pain controlled. PPx: ASA 81 BID. On bowel regimen and incentive spirometer  #L femoral fx   #Osteoporosis - TSH, PTH nml   -- discussed osteoporosis dx w patient. Recommend outpatient DEXA scan and to start bisphosphonates    #Blood loss anemia - No labs today -- 9.4 today. Was 11.5 on admission. Appears asymptomatic  #HTN - c/w home losartan  #Fibromylagia - no longer on lyrica per patient  #Depression - home on selegiline 5mg AM and venlafaxine 150mg daily    Plan  Regarding dysuria: obtain UA -- clean thus unlikely UTI  Obtain bladder scan q6h. Pain may be d/t irritation after rios removal  Optimize pain  f/u 25,OH-D    DISPO: KELSIE

## 2023-12-05 NOTE — PROGRESS NOTE ADULT - SUBJECTIVE AND OBJECTIVE BOX
Ortho Note    Pt seen and examined on morning rounds. Pt anxious and restless overnight.  Denies CP, SOB, N/V, numbness/tingling     Vital Signs Last 24 Hrs  T(C): 37.4 (12-05-23 @ 05:16), Max: 37.4 (12-05-23 @ 05:16)  T(F): 99.4 (12-05-23 @ 05:16), Max: 99.4 (12-05-23 @ 05:16)  HR: 85 (12-05-23 @ 06:10) (85 - 87)  BP: 171/79 (12-05-23 @ 06:10) (171/79 - 189/88)  BP(mean): --  RR: 18 (12-05-23 @ 06:10) (18 - 18)  SpO2: 95% (12-05-23 @ 06:10) (95% - 97%)  I&O's Summary    04 Dec 2023 07:01  -  05 Dec 2023 07:00  --------------------------------------------------------  IN: 580 mL / OUT: 1300 mL / NET: -720 mL        Physical Exam:  Physical Exam:  LLE  DSG C/D/I - Aquacel  Pulses: 2+ DP  Sensation: SILT  Motor: 5/5 EHL/FHL/TA/GS  Placed in cam boot                            9.4    8.61  )-----------( 213      ( 04 Dec 2023 06:09 )             29.2     12-04    136  |  104  |  26<H>  ----------------------------<  115<H>  4.0   |  24  |  0.68    Ca    8.1<L>      04 Dec 2023 06:09        A/P: 78yMale s/p L hip daniella with Dr. Moore on 12/2.  - Stable  - Pain Control  - DVT ppx: ASA 81  - Post op abx: Ancef  - PT, WBS: WBAT posterior hip precautions, WBAT L ankle with CAM boot  Dispo - [facility]      Alvaro Jeremy, PGY-3  Ortho Pager [phone] Ortho Note    Pt seen and examined on morning rounds. Pt anxious and restless overnight.  Denies CP, SOB, N/V, numbness/tingling     Vital Signs Last 24 Hrs  T(C): 37.4 (12-05-23 @ 05:16), Max: 37.4 (12-05-23 @ 05:16)  T(F): 99.4 (12-05-23 @ 05:16), Max: 99.4 (12-05-23 @ 05:16)  HR: 85 (12-05-23 @ 06:10) (85 - 87)  BP: 171/79 (12-05-23 @ 06:10) (171/79 - 189/88)  BP(mean): --  RR: 18 (12-05-23 @ 06:10) (18 - 18)  SpO2: 95% (12-05-23 @ 06:10) (95% - 97%)  I&O's Summary    04 Dec 2023 07:01  -  05 Dec 2023 07:00  --------------------------------------------------------  IN: 580 mL / OUT: 1300 mL / NET: -720 mL        Physical Exam:  Physical Exam:  LLE  DSG C/D/I - Aquacel  Pulses: 2+ DP  Sensation: SILT  Motor: 5/5 EHL/FHL/TA/GS  Placed in cam boot                            9.4    8.61  )-----------( 213      ( 04 Dec 2023 06:09 )             29.2     12-04    136  |  104  |  26<H>  ----------------------------<  115<H>  4.0   |  24  |  0.68    Ca    8.1<L>      04 Dec 2023 06:09        A/P: 78yMale s/p L hip daniella with Dr. Moore on 12/2.  - Stable  - Pain Control  - DVT ppx: ASA 81  - Post op abx: Ancef  - PT, WBS: WBAT posterior hip precautions, WBAT L ankle with CAM boot  Dispo - [facility]      Alvaro Jeremy, PGY-3  Ortho Pager [phone] Ortho Note    Pt seen and examined on morning rounds. Pt anxious and restless overnight. Endorses taking alprazolam 0.5 nightly - likely in benzodiazepine withdrawal. Denies hallucinations - auditory or sensory  Denies CP, SOB, N/V, numbness/tingling     Vital Signs Last 24 Hrs  T(C): 37.4 (12-05-23 @ 05:16), Max: 37.4 (12-05-23 @ 05:16)  T(F): 99.4 (12-05-23 @ 05:16), Max: 99.4 (12-05-23 @ 05:16)  HR: 85 (12-05-23 @ 06:10) (85 - 87)  BP: 171/79 (12-05-23 @ 06:10) (171/79 - 189/88)  BP(mean): --  RR: 18 (12-05-23 @ 06:10) (18 - 18)  SpO2: 95% (12-05-23 @ 06:10) (95% - 97%)  I&O's Summary    04 Dec 2023 07:01  -  05 Dec 2023 07:00  --------------------------------------------------------  IN: 580 mL / OUT: 1300 mL / NET: -720 mL        Physical Exam:  Physical Exam:  LLE  DSG C/D/I - Aquacel  Pulses: 2+ DP  Sensation: SILT  Motor: 5/5 EHL/FHL/TA/GS  Placed in cam boot                            9.4    8.61  )-----------( 213      ( 04 Dec 2023 06:09 )             29.2     12-04    136  |  104  |  26<H>  ----------------------------<  115<H>  4.0   |  24  |  0.68    Ca    8.1<L>      04 Dec 2023 06:09        A/P: 78yMale s/p L hip daniella, anterior with Dr. Moore on 12/2.  - Stable  - Pain Control  - DVT ppx: ASA 81  - placed on CIWA protocol for benzodiazepine withdrawal, alprazolam stat, ekg  - f/u echo  - Post op abx: Ancef  - PT, WBS: WBAT LLE, WBAT L ankle with CAM boot  Dispo - [facility] Luna, PGY-3  Ortho Pager [phone] Ortho Note    Pt seen and examined on morning rounds. Pt anxious and restless overnight. Endorses taking alprazolam 0.5 nightly - likely in benzodiazepine withdrawal. Denies hallucinations - auditory or sensory  Denies CP, SOB, N/V, numbness/tingling     Vital Signs Last 24 Hrs  T(C): 37.4 (12-05-23 @ 05:16), Max: 37.4 (12-05-23 @ 05:16)  T(F): 99.4 (12-05-23 @ 05:16), Max: 99.4 (12-05-23 @ 05:16)  HR: 85 (12-05-23 @ 06:10) (85 - 87)  BP: 171/79 (12-05-23 @ 06:10) (171/79 - 189/88)  BP(mean): --  RR: 18 (12-05-23 @ 06:10) (18 - 18)  SpO2: 95% (12-05-23 @ 06:10) (95% - 97%)  I&O's Summary    04 Dec 2023 07:01  -  05 Dec 2023 07:00  --------------------------------------------------------  IN: 580 mL / OUT: 1300 mL / NET: -720 mL        Physical Exam:  Physical Exam:  LLE  DSG C/D/I - Aquacel  Pulses: 2+ DP  Sensation: SILT  Motor: 5/5 EHL/FHL/TA/GS  Placed in cam boot                            9.4    8.61  )-----------( 213      ( 04 Dec 2023 06:09 )             29.2     12-04    136  |  104  |  26<H>  ----------------------------<  115<H>  4.0   |  24  |  0.68    Ca    8.1<L>      04 Dec 2023 06:09        A/P: 78yMale s/p L hip daniella, anterior with Dr. Moore on 12/2.  - Stable  - Pain Control  - DVT ppx: ASA 81  - placed on CIWA protocol for benzodiazepine withdrawal, alprazolam stat, ekg  - f/u echo  - Post op abx: Ancef  - PT, WBS: WBAT LLE, WBAT L ankle with CAM boot  Dispo - [facility]E Luna, PGY-3  Ortho Pager [phone]

## 2023-12-06 ENCOUNTER — TRANSCRIPTION ENCOUNTER (OUTPATIENT)
Age: 78
End: 2023-12-06

## 2023-12-06 VITALS
RESPIRATION RATE: 18 BRPM | SYSTOLIC BLOOD PRESSURE: 135 MMHG | TEMPERATURE: 98 F | OXYGEN SATURATION: 94 % | HEART RATE: 76 BPM | DIASTOLIC BLOOD PRESSURE: 76 MMHG

## 2023-12-06 LAB
24R-OH-CALCIDIOL SERPL-MCNC: 62.5 NG/ML — SIGNIFICANT CHANGE UP (ref 30–80)
24R-OH-CALCIDIOL SERPL-MCNC: 62.5 NG/ML — SIGNIFICANT CHANGE UP (ref 30–80)
ANION GAP SERPL CALC-SCNC: 7 MMOL/L — SIGNIFICANT CHANGE UP (ref 5–17)
ANION GAP SERPL CALC-SCNC: 7 MMOL/L — SIGNIFICANT CHANGE UP (ref 5–17)
BUN SERPL-MCNC: 15 MG/DL — SIGNIFICANT CHANGE UP (ref 7–23)
BUN SERPL-MCNC: 15 MG/DL — SIGNIFICANT CHANGE UP (ref 7–23)
CALCIUM SERPL-MCNC: 8 MG/DL — LOW (ref 8.4–10.5)
CALCIUM SERPL-MCNC: 8 MG/DL — LOW (ref 8.4–10.5)
CHLORIDE SERPL-SCNC: 106 MMOL/L — SIGNIFICANT CHANGE UP (ref 96–108)
CHLORIDE SERPL-SCNC: 106 MMOL/L — SIGNIFICANT CHANGE UP (ref 96–108)
CO2 SERPL-SCNC: 27 MMOL/L — SIGNIFICANT CHANGE UP (ref 22–31)
CO2 SERPL-SCNC: 27 MMOL/L — SIGNIFICANT CHANGE UP (ref 22–31)
CREAT SERPL-MCNC: 0.66 MG/DL — SIGNIFICANT CHANGE UP (ref 0.5–1.3)
CREAT SERPL-MCNC: 0.66 MG/DL — SIGNIFICANT CHANGE UP (ref 0.5–1.3)
EGFR: 96 ML/MIN/1.73M2 — SIGNIFICANT CHANGE UP
EGFR: 96 ML/MIN/1.73M2 — SIGNIFICANT CHANGE UP
GLUCOSE SERPL-MCNC: 100 MG/DL — HIGH (ref 70–99)
GLUCOSE SERPL-MCNC: 100 MG/DL — HIGH (ref 70–99)
HCT VFR BLD CALC: 25.3 % — LOW (ref 39–50)
HCT VFR BLD CALC: 25.3 % — LOW (ref 39–50)
HGB BLD-MCNC: 8.1 G/DL — LOW (ref 13–17)
HGB BLD-MCNC: 8.1 G/DL — LOW (ref 13–17)
MCHC RBC-ENTMCNC: 28.2 PG — SIGNIFICANT CHANGE UP (ref 27–34)
MCHC RBC-ENTMCNC: 28.2 PG — SIGNIFICANT CHANGE UP (ref 27–34)
MCHC RBC-ENTMCNC: 32 GM/DL — SIGNIFICANT CHANGE UP (ref 32–36)
MCHC RBC-ENTMCNC: 32 GM/DL — SIGNIFICANT CHANGE UP (ref 32–36)
MCV RBC AUTO: 88.2 FL — SIGNIFICANT CHANGE UP (ref 80–100)
MCV RBC AUTO: 88.2 FL — SIGNIFICANT CHANGE UP (ref 80–100)
NRBC # BLD: 0 /100 WBCS — SIGNIFICANT CHANGE UP (ref 0–0)
NRBC # BLD: 0 /100 WBCS — SIGNIFICANT CHANGE UP (ref 0–0)
PLATELET # BLD AUTO: 236 K/UL — SIGNIFICANT CHANGE UP (ref 150–400)
PLATELET # BLD AUTO: 236 K/UL — SIGNIFICANT CHANGE UP (ref 150–400)
POTASSIUM SERPL-MCNC: 3.6 MMOL/L — SIGNIFICANT CHANGE UP (ref 3.5–5.3)
POTASSIUM SERPL-MCNC: 3.6 MMOL/L — SIGNIFICANT CHANGE UP (ref 3.5–5.3)
POTASSIUM SERPL-SCNC: 3.6 MMOL/L — SIGNIFICANT CHANGE UP (ref 3.5–5.3)
POTASSIUM SERPL-SCNC: 3.6 MMOL/L — SIGNIFICANT CHANGE UP (ref 3.5–5.3)
RBC # BLD: 2.87 M/UL — LOW (ref 4.2–5.8)
RBC # BLD: 2.87 M/UL — LOW (ref 4.2–5.8)
RBC # FLD: 13.2 % — SIGNIFICANT CHANGE UP (ref 10.3–14.5)
RBC # FLD: 13.2 % — SIGNIFICANT CHANGE UP (ref 10.3–14.5)
SODIUM SERPL-SCNC: 140 MMOL/L — SIGNIFICANT CHANGE UP (ref 135–145)
SODIUM SERPL-SCNC: 140 MMOL/L — SIGNIFICANT CHANGE UP (ref 135–145)
WBC # BLD: 5.82 K/UL — SIGNIFICANT CHANGE UP (ref 3.8–10.5)
WBC # BLD: 5.82 K/UL — SIGNIFICANT CHANGE UP (ref 3.8–10.5)
WBC # FLD AUTO: 5.82 K/UL — SIGNIFICANT CHANGE UP (ref 3.8–10.5)
WBC # FLD AUTO: 5.82 K/UL — SIGNIFICANT CHANGE UP (ref 3.8–10.5)

## 2023-12-06 PROCEDURE — 99233 SBSQ HOSP IP/OBS HIGH 50: CPT

## 2023-12-06 RX ORDER — POLYETHYLENE GLYCOL 3350 17 G/17G
17 POWDER, FOR SOLUTION ORAL
Qty: 0 | Refills: 0 | DISCHARGE
Start: 2023-12-06

## 2023-12-06 RX ORDER — PANTOPRAZOLE SODIUM 20 MG/1
1 TABLET, DELAYED RELEASE ORAL
Qty: 0 | Refills: 0 | DISCHARGE
Start: 2023-12-06

## 2023-12-06 RX ORDER — HYDROMORPHONE HYDROCHLORIDE 2 MG/ML
1 INJECTION INTRAMUSCULAR; INTRAVENOUS; SUBCUTANEOUS
Qty: 0 | Refills: 0 | DISCHARGE
Start: 2023-12-06

## 2023-12-06 RX ORDER — ASPIRIN/CALCIUM CARB/MAGNESIUM 324 MG
1 TABLET ORAL
Qty: 0 | Refills: 0 | DISCHARGE
Start: 2023-12-06

## 2023-12-06 RX ORDER — ALPRAZOLAM 0.25 MG
1 TABLET ORAL
Qty: 0 | Refills: 0 | DISCHARGE
Start: 2023-12-06

## 2023-12-06 RX ORDER — LOSARTAN POTASSIUM 100 MG/1
75 TABLET, FILM COATED ORAL DAILY
Refills: 0 | Status: DISCONTINUED | OUTPATIENT
Start: 2023-12-06 | End: 2023-12-06

## 2023-12-06 RX ORDER — OLMESARTAN MEDOXOMIL 5 MG/1
1.5 TABLET, FILM COATED ORAL
Qty: 0 | Refills: 0 | DISCHARGE

## 2023-12-06 RX ORDER — SENNA PLUS 8.6 MG/1
2 TABLET ORAL
Qty: 0 | Refills: 0 | DISCHARGE
Start: 2023-12-06

## 2023-12-06 RX ORDER — ACETAMINOPHEN 500 MG
2 TABLET ORAL
Qty: 0 | Refills: 0 | DISCHARGE
Start: 2023-12-06

## 2023-12-06 RX ADMIN — HYDROMORPHONE HYDROCHLORIDE 2 MILLIGRAM(S): 2 INJECTION INTRAMUSCULAR; INTRAVENOUS; SUBCUTANEOUS at 07:05

## 2023-12-06 RX ADMIN — LOSARTAN POTASSIUM 50 MILLIGRAM(S): 100 TABLET, FILM COATED ORAL at 06:02

## 2023-12-06 RX ADMIN — SELEGILINE HYDROCHLORIDE 5 MILLIGRAM(S): 1.25 TABLET, ORALLY DISINTEGRATING ORAL at 07:51

## 2023-12-06 RX ADMIN — Medication 1000 MILLIGRAM(S): at 13:52

## 2023-12-06 RX ADMIN — Medication 1000 MILLIGRAM(S): at 06:03

## 2023-12-06 RX ADMIN — Medication 81 MILLIGRAM(S): at 17:12

## 2023-12-06 RX ADMIN — Medication 150 MILLIGRAM(S): at 13:03

## 2023-12-06 RX ADMIN — PANTOPRAZOLE SODIUM 40 MILLIGRAM(S): 20 TABLET, DELAYED RELEASE ORAL at 06:40

## 2023-12-06 RX ADMIN — LOSARTAN POTASSIUM 25 MILLIGRAM(S): 100 TABLET, FILM COATED ORAL at 17:12

## 2023-12-06 RX ADMIN — Medication 81 MILLIGRAM(S): at 06:03

## 2023-12-06 RX ADMIN — Medication 1 TABLET(S): at 13:01

## 2023-12-06 RX ADMIN — Medication 1 MILLIGRAM(S): at 13:01

## 2023-12-06 RX ADMIN — HYDROMORPHONE HYDROCHLORIDE 2 MILLIGRAM(S): 2 INJECTION INTRAMUSCULAR; INTRAVENOUS; SUBCUTANEOUS at 06:03

## 2023-12-06 NOTE — PROGRESS NOTE ADULT - SUBJECTIVE AND OBJECTIVE BOX
Ortho Note     Much more calmer throughout the day yesterday and overnight. Taken off CIWA protocol - CIWA score of zero per nursing on evaluation yesterday. Restlessness, tremors, anxiety likely due to missed doses of home anxiety medication, patient also miscommunicated with medical team about medications that he takes before bedtime - he does not take xanax as previously thought and was not in benzodiazepine withdrawal.    Pt seen and examined on morning rounds. Calmer and comfortable this morning. PO dilaudid effective in treating pain. Feels he is progressing with PT.  Denies CP, SOB, N/V, numbness/tingling     Vital Signs Last 24 Hrs  T(C): 36.8 (12-06-23 @ 04:30), Max: 36.9 (12-06-23 @ 00:29)  T(F): 98.2 (12-06-23 @ 04:30), Max: 98.4 (12-06-23 @ 00:29)  HR: 74 (12-06-23 @ 06:00) (72 - 74)  BP: 164/75 (12-06-23 @ 06:00) (161/76 - 164/75)  BP(mean): --  RR: 18 (12-06-23 @ 06:00) (16 - 18)  SpO2: 95% (12-06-23 @ 06:00) (94% - 95%)  I&O's Summary    04 Dec 2023 07:01  -  05 Dec 2023 07:00  --------------------------------------------------------  IN: 580 mL / OUT: 1300 mL / NET: -720 mL    05 Dec 2023 07:01  -  06 Dec 2023 06:08  --------------------------------------------------------  IN: 1680 mL / OUT: 1850 mL / NET: -170 mL        Physical Exam:  LLE  DSG C/D/I - Aquacel  Pulses: 2+ DP  Sensation: SILT  Motor: 5/5 EHL/FHL/TA/GS  Placed in cam boot                            9.4    8.61  )-----------( 213      ( 04 Dec 2023 06:09 )             29.2     12-04    136  |  104  |  26<H>  ----------------------------<  115<H>  4.0   |  24  |  0.68    Ca    8.1<L>      04 Dec 2023 06:09    TPro  5.5<L>  /  Alb  3.1<L>  /  TBili  0.4  /  DBili  <0.2  /  AST  27  /  ALT  18  /  AlkPhos  96  12-05      A/P: 78yMale s/p L hip daniella, anterior with Dr. Moore on 12/2.  - Stable  - Pain Control  - DVT ppx: ASA 81  - CIWA score of 0 (12/5) - CIWA protocol d/c'd - misccomunication with medical staff - confirmed that he does not take daily qhs benzo's - much calmer this morning  - f/u echo - EF 60-65%  - PT, WBS: WBAT L HIP and Ankle in CAM boot  - Dispo: [facility] pending auth/acc    Alvaro Luna, PGY-3  Ortho Pager [phone] Ortho Note     Much more calmer throughout the day yesterday and overnight. Taken off CIWA protocol - CIWA score of zero per nursing on evaluation yesterday. Restlessness, tremors, anxiety likely due to missed doses of home anxiety medication, patient also miscommunicated with medical team about medications that he takes before bedtime - he does not take xanax as previously thought and was not in benzodiazepine withdrawal.    Pt seen and examined on morning rounds. Calmer and comfortable this morning. PO dilaudid effective in treating pain. Feels he is progressing with PT.  Denies CP, SOB, N/V, numbness/tingling     Vital Signs Last 24 Hrs  T(C): 36.8 (12-06-23 @ 04:30), Max: 36.9 (12-06-23 @ 00:29)  T(F): 98.2 (12-06-23 @ 04:30), Max: 98.4 (12-06-23 @ 00:29)  HR: 74 (12-06-23 @ 06:00) (72 - 74)  BP: 164/75 (12-06-23 @ 06:00) (161/76 - 164/75)  BP(mean): --  RR: 18 (12-06-23 @ 06:00) (16 - 18)  SpO2: 95% (12-06-23 @ 06:00) (94% - 95%)  I&O's Summary    04 Dec 2023 07:01  -  05 Dec 2023 07:00  --------------------------------------------------------  IN: 580 mL / OUT: 1300 mL / NET: -720 mL    05 Dec 2023 07:01  -  06 Dec 2023 06:08  --------------------------------------------------------  IN: 1680 mL / OUT: 1850 mL / NET: -170 mL        Physical Exam:  LLE  DSG C/D/I - Aquacel  Pulses: 2+ DP  Sensation: SILT  Motor: 5/5 EHL/FHL/TA/GS  Placed in cam boot                            9.4    8.61  )-----------( 213      ( 04 Dec 2023 06:09 )             29.2     12-04    136  |  104  |  26<H>  ----------------------------<  115<H>  4.0   |  24  |  0.68    Ca    8.1<L>      04 Dec 2023 06:09    TPro  5.5<L>  /  Alb  3.1<L>  /  TBili  0.4  /  DBili  <0.2  /  AST  27  /  ALT  18  /  AlkPhos  96  12-05      A/P: 78yMale s/p L hip daniella, anterior with Dr. Moore on 12/2.  - Stable  - Pain Control  - DVT ppx: ASA 81  - CIWA score of 0 (12/5) - CIWA protocol d/c'd - misccomunication with medical staff - confirmed that he does not take daily qhs benzo's - much calmer this morning  - f/u echo - EF 60-65%  - PT, WBS: WBAT L HIP and Ankle in CAM boot  - Dispo: [facility] pending auth/acc    Alvaro Luna, PGY-3  Ortho Pager [phone]

## 2023-12-06 NOTE — PROGRESS NOTE ADULT - SUBJECTIVE AND OBJECTIVE BOX
Orthopaedic Surgery Progress Note    Subjective:     Patient seen and examined. Patient comfortable without complaints, pain controlled.  De    Objective:    Vital Signs Last 24 Hrs  T(C): 36.7 (12-06-23 @ 10:00), Max: 36.7 (12-06-23 @ 10:00)  T(F): 98.1 (12-06-23 @ 10:00), Max: 98.1 (12-06-23 @ 10:00)  HR: 74 (12-06-23 @ 10:00) (74 - 74)  BP: 160/72 (12-06-23 @ 10:00) (160/72 - 160/72)  BP(mean): --  RR: 18 (12-06-23 @ 10:00) (18 - 18)  SpO2: 95% (12-06-23 @ 10:00) (95% - 95%)  AVSS    PE:  General: Patient alert and oriented, NAD  Dressing: Clean/dry/intact left hip aquacel, left lower extremity CAM boot  CAM boot opened to inspect left ankle - no open wounds/erythema, medial and lateral malleolus non-tender to palpation , wiggling left toes                            8.1    5.82  )-----------( 236      ( 06 Dec 2023 05:30 )             25.3   12-06    140  |  106  |  15  ----------------------------<  100<H>  3.6   |  27  |  0.66    Ca    8.0<L>      06 Dec 2023 05:30    TPro  5.5<L>  /  Alb  3.1<L>  /  TBili  0.4  /  DBili  <0.2  /  AST  27  /  ALT  18  /  AlkPhos  96  12-05      A/P: 78yMale s/p right hip hemiarthroplasty on 12/2/23; also has old left ankle fracture in CAM boot   1. Pain control as needed  2. DVT prophylaxis:  ASA  3. PT, weight-bearing status: WBAT left lower extremity; discussed with Dr. Eddy who is following patient for left ankle fracture, ok to transition from CAM boot to air cast   4. Dispo: pending [facility]  5. appreciate medicine recommendations - per Dr. Galindo, losartan increased to 75mg; optimized for d/c to [facility] from medical standpoint per dr. galindo

## 2023-12-06 NOTE — DISCHARGE NOTE NURSING/CASE MANAGEMENT/SOCIAL WORK - PATIENT PORTAL LINK FT
You can access the FollowMyHealth Patient Portal offered by [facility] by registering at the following website: [email or web address]. By joining [employer]’s FollowMyHealth portal, you will also be able to view your health information using other applications (apps) compatible with our system. You can access the FollowMyHealth Patient Portal offered by [facility] by registering at the following website: [email or web address]. By joining [email or web address]’s FollowMyHealth portal, you will also be able to view your health information using other applications (apps) compatible with our system.

## 2023-12-06 NOTE — DISCHARGE NOTE NURSING/CASE MANAGEMENT/SOCIAL WORK - NSDCPEFALRISK_GEN_ALL_CORE
For information on Fall & Injury Prevention, visit: [email or web address] OR  [email or web address] OR  https://www.cdc.gov/steadi/patient.html For information on Fall & Injury Prevention, visit: [email or web address] OR  [email or web address] OR  https://www.cdc.gov/steadi/patient.html

## 2023-12-06 NOTE — PROGRESS NOTE ADULT - PROVIDER SPECIALTY LIST ADULT
Hospitalist
Orthopedics
Hospitalist

## 2023-12-06 NOTE — DISCHARGE NOTE NURSING/CASE MANAGEMENT/SOCIAL WORK - NSDPFAC_GEN_ALL_CORE
[facility]/ [address] [place] [address], [place] [address]/ Phone: [phone] [facility] [address] [place] [address], [place] [address]/ Phone: [phone]

## 2023-12-06 NOTE — PROGRESS NOTE ADULT - SUBJECTIVE AND OBJECTIVE BOX
INTERVAL HPI/OVERNIGHT EVENTS: tom o/n    SUBJECTIVE: Patient seen and examined at bedside.   Feels burning in urine is improved significantly. Pain is controlled in R hip. Denies LH/dizziness, chest pain, dyspnea. Eating wo N/V/Abd pain. Eager to go to rehab.     OBJECTIVE:    VITAL SIGNS:  ICU Vital Signs Last 24 Hrs  T(C): 36.7 (06 Dec 2023 10:00), Max: 37.2 (05 Dec 2023 17:58)  T(F): 98.1 (06 Dec 2023 10:00), Max: 98.9 (05 Dec 2023 17:58)  HR: 74 (06 Dec 2023 10:00) (72 - 82)  BP: 160/72 (06 Dec 2023 10:00) (156/79 - 166/81)  BP(mean): --  ABP: --  ABP(mean): --  RR: 18 (06 Dec 2023 10:00) (16 - 18)  SpO2: 95% (06 Dec 2023 10:00) (91% - 95%)    O2 Parameters below as of 06 Dec 2023 10:00  Patient On (Oxygen Delivery Method): room air              12-05 @ 07:01  -  12-06 @ 07:00  --------------------------------------------------------  IN: 1680 mL / OUT: 1850 mL / NET: -170 mL    12-06 @ 07:01  -  12-06 @ 11:38  --------------------------------------------------------  IN: 0 mL / OUT: 400 mL / NET: -400 mL      CAPILLARY BLOOD GLUCOSE          PHYSICAL EXAM:  GEN: Male in NAD on RA  HEENT: NC/AT, MMM  CV: RRR, nml S1S2, no murmurs  PULM: nml effort, CTAB  ABD: Soft, non-distended, NABS, non-tender  NEURO  A/O x3, moving all extremities, R hip dressing c/d/i. 5/5 in plantarflex/ext. Sensation intact  PSYCH: Appropriate      MEDICATIONS:  MEDICATIONS  (STANDING):  acetaminophen     Tablet .. 1000 milliGRAM(s) Oral every 8 hours  aspirin enteric coated 81 milliGRAM(s) Oral two times a day  folic acid 1 milliGRAM(s) Oral daily  lactated ringers. 1000 milliLiter(s) (100 mL/Hr) IV Continuous <Continuous>  losartan 75 milliGRAM(s) Oral daily  multivitamin 1 Tablet(s) Oral daily  pantoprazole    Tablet 40 milliGRAM(s) Oral before breakfast  polyethylene glycol 3350 17 Gram(s) Oral at bedtime  selegiline Oral Tab/Cap 5 milliGRAM(s) Oral <User Schedule>  senna 2 Tablet(s) Oral at bedtime  tamsulosin 0.4 milliGRAM(s) Oral at bedtime  venlafaxine XR. 150 milliGRAM(s) Oral daily    MEDICATIONS  (PRN):  ALPRAZolam 0.5 milliGRAM(s) Oral daily PRN anxiety  aluminum hydroxide/magnesium hydroxide/simethicone Suspension 30 milliLiter(s) Oral four times a day PRN Indigestion  bisacodyl Suppository 10 milliGRAM(s) Rectal once PRN Constipation  HYDROmorphone   Tablet 2 milliGRAM(s) Oral every 4 hours PRN Moderate Pain (4 - 6)  HYDROmorphone   Tablet 4 milliGRAM(s) Oral every 4 hours PRN Severe Pain (7 - 10)  HYDROmorphone  Injectable 0.5 milliGRAM(s) IV Push every 4 hours PRN Breakthrough pain  magnesium hydroxide Suspension 30 milliLiter(s) Oral daily PRN Constipation  melatonin 5 milliGRAM(s) Oral at bedtime PRN Insomnia  ondansetron Injectable 4 milliGRAM(s) IV Push every 6 hours PRN Nausea and/or Vomiting      ALLERGIES:  Allergies    No Known Allergies    Intolerances        LABS:                        8.1    5.82  )-----------( 236      ( 06 Dec 2023 05:30 )             25.3     12-06    140  |  106  |  15  ----------------------------<  100<H>  3.6   |  27  |  0.66    Ca    8.0<L>      06 Dec 2023 05:30    TPro  5.5<L>  /  Alb  3.1<L>  /  TBili  0.4  /  DBili  <0.2  /  AST  27  /  ALT  18  /  AlkPhos  96  12-05      Urinalysis Basic - ( 06 Dec 2023 05:30 )    Color: x / Appearance: x / SG: x / pH: x  Gluc: 100 mg/dL / Ketone: x  / Bili: x / Urobili: x   Blood: x / Protein: x / Nitrite: x   Leuk Esterase: x / RBC: x / WBC x   Sq Epi: x / Non Sq Epi: x / Bacteria: x        RADIOLOGY & ADDITIONAL TESTS: Reviewed.

## 2023-12-06 NOTE — PROGRESS NOTE ADULT - ASSESSMENT
PCP: Dr Aga Del Castillo [phone]; fax -   78M w HTN, BPH, spinal surgery - L4 spinal fusion, depression, history of 3 falls c/f syncopal episodes is last several months, p/w syncopal episode found to have L femoral neck fracture s/p L hip hemiarthroplasty 12/2 w Dr. Moore    #Dysuria - improving    #Syncopal episode - pt denies prodromal symptoms but felt he fell forward after getting up out of bed. Reports outpatient work-up with Dr. Yung/Brendan Ansari at [facility] EKG/TTE being unremarkable. Pt recalls being told possible contributing factor was doxazosin - suspect orthostatic hypotension but this has been the only medication that has been effective for his BPH   -TTE shows normal LVEF. Slightly elevated PASP    #Post-op state. Pain controlled. PPx: ASA 81 BID. On bowel regimen and incentive spirometer  #L femoral fx   #Osteoporosis - TSH, PTH nml   -- discussed osteoporosis dx w patient. Recommend outpatient DEXA scan and to start bisphosphonates    #Blood loss anemia - No labs today -- 9.4 today. Was 11.5 on admission. Appears asymptomatic  #HTN - BP above target 150-160s. Pain is controlled  #Fibromylagia - no longer on lyrica per patient  #Depression - home on selegiline 5mg AM and venlafaxine 150mg daily    Plan  Dysuria improving - UA negative for UTI. SUspect irritation from rios  Pain optimized - would increase losartan from 50mg to 75mg -- home pt is on olmesartan 20mg = losartan 50mg. Thus would dc on olmesartan 30mg daily.   f/u 25,OH-D    DISPO: [facility]   PCP: Dr Aga Del Castillo [phone]; fax -   78M w HTN, BPH, spinal surgery - L4 spinal fusion, depression, history of 3 falls c/f syncopal episodes is last several months, p/w syncopal episode found to have L femoral neck fracture s/p L hip hemiarthroplasty 12/2 w Dr. Moore    #Dysuria - improving    #Syncopal episode - pt denies prodromal symptoms but felt he fell forward after getting up out of bed. Reports outpatient work-up with Dr. Yung/Brendan Ansari at [facility] EKG/TTE being unremarkable. Pt recalls being told possible contributing factor was doxazosin - suspect orthostatic hypotension but this has been the only medication that has been effective for his BPH   -TTE shows normal LVEF. Slightly elevated PASP    #Post-op state. Pain controlled. PPx: ASA 81 BID. On bowel regimen and incentive spirometer  #L femoral fx   #Osteoporosis - TSH, PTH nml   -- discussed osteoporosis dx w patient. Recommend outpatient DEXA scan and to start bisphosphonates    #Blood loss anemia - No labs today -- 9.4 today. Was 11.5 on admission. Appears asymptomatic  #HTN - BP above target 150-160s. Pain is controlled  #Fibromylagia - no longer on lyrica per patient  #Depression - home on selegiline 5mg AM and venlafaxine 150mg daily    Plan  Dysuria improving - UA negative for UTI. SUspect irritation from rios  Pain optimized - would increase losartan from 50mg to 75mg -- home pt is on olmesartan 20mg = losartan 50mg. Thus would dc on olmesartan 30mg daily.   f/u 25,OH-D    DISPO: [facility]

## 2023-12-12 DIAGNOSIS — S72.002A FRACTURE OF UNSPECIFIED PART OF NECK OF LEFT FEMUR, INITIAL ENCOUNTER FOR CLOSED FRACTURE: ICD-10-CM

## 2023-12-12 DIAGNOSIS — F32.9 MAJOR DEPRESSIVE DISORDER, SINGLE EPISODE, UNSPECIFIED: ICD-10-CM
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
## 2023-12-12 DIAGNOSIS — N40.0 BENIGN PROSTATIC HYPERPLASIA WITHOUT LOWER URINARY TRACT SYMPTOMS: ICD-10-CM

## 2023-12-12 DIAGNOSIS — M81.0 AGE-RELATED OSTEOPOROSIS WITHOUT CURRENT PATHOLOGICAL FRACTURE: ICD-10-CM

## 2023-12-12 DIAGNOSIS — I10 ESSENTIAL (PRIMARY) HYPERTENSION: ICD-10-CM

## 2023-12-13 PROCEDURE — 97165 OT EVAL LOW COMPLEX 30 MIN: CPT

## 2023-12-13 PROCEDURE — 71110 X-RAY EXAM RIBS BIL 3 VIEWS: CPT

## 2023-12-13 PROCEDURE — 93005 ELECTROCARDIOGRAM TRACING: CPT

## 2023-12-13 PROCEDURE — 73700 CT LOWER EXTREMITY W/O DYE: CPT | Mod: MA

## 2023-12-13 PROCEDURE — 93306 TTE W/DOPPLER COMPLETE: CPT

## 2023-12-13 PROCEDURE — 81001 URINALYSIS AUTO W/SCOPE: CPT

## 2023-12-13 PROCEDURE — 97116 GAIT TRAINING THERAPY: CPT

## 2023-12-13 PROCEDURE — 82550 ASSAY OF CK (CPK): CPT

## 2023-12-13 PROCEDURE — 82553 CREATINE MB FRACTION: CPT

## 2023-12-13 PROCEDURE — 72192 CT PELVIS W/O DYE: CPT | Mod: MA

## 2023-12-13 PROCEDURE — 86803 HEPATITIS C AB TEST: CPT

## 2023-12-13 PROCEDURE — 85610 PROTHROMBIN TIME: CPT

## 2023-12-13 PROCEDURE — 86901 BLOOD TYPING SEROLOGIC RH(D): CPT

## 2023-12-13 PROCEDURE — 83735 ASSAY OF MAGNESIUM: CPT

## 2023-12-13 PROCEDURE — 70450 CT HEAD/BRAIN W/O DYE: CPT | Mod: MA

## 2023-12-13 PROCEDURE — 99285 EMERGENCY DEPT VISIT HI MDM: CPT

## 2023-12-13 PROCEDURE — 83970 ASSAY OF PARATHORMONE: CPT

## 2023-12-13 PROCEDURE — 96375 TX/PRO/DX INJ NEW DRUG ADDON: CPT

## 2023-12-13 PROCEDURE — 80076 HEPATIC FUNCTION PANEL: CPT

## 2023-12-13 PROCEDURE — 87086 URINE CULTURE/COLONY COUNT: CPT

## 2023-12-13 PROCEDURE — 82306 VITAMIN D 25 HYDROXY: CPT

## 2023-12-13 PROCEDURE — 82310 ASSAY OF CALCIUM: CPT

## 2023-12-13 PROCEDURE — 85730 THROMBOPLASTIN TIME PARTIAL: CPT

## 2023-12-13 PROCEDURE — 96376 TX/PRO/DX INJ SAME DRUG ADON: CPT

## 2023-12-13 PROCEDURE — 86900 BLOOD TYPING SEROLOGIC ABO: CPT

## 2023-12-13 PROCEDURE — 80307 DRUG TEST PRSMV CHEM ANLYZR: CPT

## 2023-12-13 PROCEDURE — C1776: CPT

## 2023-12-13 PROCEDURE — 85027 COMPLETE CBC AUTOMATED: CPT

## 2023-12-13 PROCEDURE — 96374 THER/PROPH/DIAG INJ IV PUSH: CPT

## 2023-12-13 PROCEDURE — 76000 FLUOROSCOPY <1 HR PHYS/QHP: CPT

## 2023-12-13 PROCEDURE — 97530 THERAPEUTIC ACTIVITIES: CPT

## 2023-12-13 PROCEDURE — 36415 COLL VENOUS BLD VENIPUNCTURE: CPT

## 2023-12-13 PROCEDURE — 73502 X-RAY EXAM HIP UNI 2-3 VIEWS: CPT

## 2023-12-13 PROCEDURE — 71045 X-RAY EXAM CHEST 1 VIEW: CPT

## 2023-12-13 PROCEDURE — 72125 CT NECK SPINE W/O DYE: CPT | Mod: MA

## 2023-12-13 PROCEDURE — 80048 BASIC METABOLIC PNL TOTAL CA: CPT

## 2023-12-13 PROCEDURE — 87635 SARS-COV-2 COVID-19 AMP PRB: CPT

## 2023-12-13 PROCEDURE — 84100 ASSAY OF PHOSPHORUS: CPT

## 2023-12-13 PROCEDURE — 73610 X-RAY EXAM OF ANKLE: CPT

## 2023-12-13 PROCEDURE — 84443 ASSAY THYROID STIM HORMONE: CPT

## 2023-12-13 PROCEDURE — 84436 ASSAY OF TOTAL THYROXINE: CPT

## 2023-12-13 PROCEDURE — 82652 VIT D 1 25-DIHYDROXY: CPT

## 2023-12-13 PROCEDURE — 84484 ASSAY OF TROPONIN QUANT: CPT

## 2023-12-13 PROCEDURE — 80053 COMPREHEN METABOLIC PANEL: CPT

## 2023-12-13 PROCEDURE — 86850 RBC ANTIBODY SCREEN: CPT

## 2023-12-13 PROCEDURE — 85025 COMPLETE CBC W/AUTO DIFF WBC: CPT

## 2025-05-27 NOTE — PRE-OP CHECKLIST - SPO2 (%)
The Service to Ortho order in work queue [id number] requested on 5/16/2025 has been deferred for one year for not being able to contact patient to schedule.  
95

## (undated) DEVICE — HOOD T5 PEELAWAY

## (undated) DEVICE — SUT VICRYL 0 36" CT-1 UNDYED

## (undated) DEVICE — ELCTR BOVIE PENCIL BLADE 10FT

## (undated) DEVICE — WOUND IRR IRRISEPT W 0.5 CHG

## (undated) DEVICE — DRAPE LIGHT HANDLE COVER (BLUE)

## (undated) DEVICE — GLV 8.5 PROTEXIS (WHITE)

## (undated) DEVICE — ELCTR AQUAMANTYS BIPOLAR SEALER 6.0

## (undated) DEVICE — DRAPE C ARM 41X74"

## (undated) DEVICE — PREP CHLORAPREP HI-LITE ORANGE 26ML

## (undated) DEVICE — SUT STRATAFIX SPIRAL MONOCRYL PLUS 3-0 30CM PS-1 UNDYED

## (undated) DEVICE — DRAPE 3/4 SHEET 52X76"

## (undated) DEVICE — SUT ETHIBOND 1 30" OS6

## (undated) DEVICE — SAW BLADE STRYKER SAGITTAL 25X86.5X1.32MM

## (undated) DEVICE — POSITIONER FOAM ABDUCTION PILLOW MED (PINK)

## (undated) DEVICE — MIDAS REX LEGEND CYLINDER FLUTED SM BORE 6.0MM X 15CM

## (undated) DEVICE — SUT STRATAFIX SPIRAL PDO 1 30CM OS-6

## (undated) DEVICE — DRSG COBAN 6"

## (undated) DEVICE — SUT ETHIBOND 1 30" OS8

## (undated) DEVICE — DRAPE BACK TABLE COVER 80X90"

## (undated) DEVICE — DRAPE U POLY BLUE 60X72"

## (undated) DEVICE — SAW BLADE STRYKER SAGITTAL 81.5X12.5X1.19MM

## (undated) DEVICE — DRSG AQUACEL 3.5 X 10"

## (undated) DEVICE — PACK TOTAL HIP

## (undated) DEVICE — DRAPE 1/2 SHEET 40X57"

## (undated) DEVICE — SUT VICRYL 1 18" CT-1 UNDYED (POP-OFF)

## (undated) DEVICE — SUT VICRYL 2-0 27" CT-1 UNDYED